# Patient Record
Sex: FEMALE | Race: WHITE | NOT HISPANIC OR LATINO | Employment: OTHER | URBAN - METROPOLITAN AREA
[De-identification: names, ages, dates, MRNs, and addresses within clinical notes are randomized per-mention and may not be internally consistent; named-entity substitution may affect disease eponyms.]

---

## 2023-09-21 PROBLEM — E78.2 MIXED HYPERLIPIDEMIA: Status: ACTIVE | Noted: 2023-09-21

## 2023-09-21 PROBLEM — E66.9 OBESITY (BMI 30.0-34.9): Status: ACTIVE | Noted: 2023-09-21

## 2023-09-21 PROBLEM — R26.81 UNSTEADY GAIT: Status: ACTIVE | Noted: 2023-09-21

## 2023-09-21 PROBLEM — E66.811 OBESITY (BMI 30.0-34.9): Status: ACTIVE | Noted: 2023-09-21

## 2023-09-21 RX ORDER — IBUPROFEN 100 MG/5ML
1000 SUSPENSION, ORAL (FINAL DOSE FORM) ORAL DAILY
COMMUNITY

## 2023-09-21 RX ORDER — ATORVASTATIN CALCIUM 10 MG/1
10 TABLET, FILM COATED ORAL DAILY
COMMUNITY
End: 2023-12-27 | Stop reason: SDUPTHER

## 2023-09-21 RX ORDER — CYANOCOBALAMIN (VITAMIN B-12) 500 MCG
800 TABLET ORAL DAILY
COMMUNITY

## 2023-09-21 RX ORDER — CLOBETASOL PROPIONATE 0.5 MG/G
1 CREAM TOPICAL 2 TIMES DAILY
COMMUNITY

## 2023-09-21 RX ORDER — NYSTATIN 100000 U/G
1 CREAM TOPICAL 2 TIMES DAILY
COMMUNITY
Start: 2022-06-10

## 2023-12-27 ENCOUNTER — TELEPHONE (OUTPATIENT)
Dept: PRIMARY CARE | Facility: CLINIC | Age: 78
End: 2023-12-27

## 2023-12-27 DIAGNOSIS — E78.2 MIXED HYPERLIPIDEMIA: Primary | ICD-10-CM

## 2023-12-27 RX ORDER — ATORVASTATIN CALCIUM 10 MG/1
10 TABLET, FILM COATED ORAL DAILY
Qty: 90 TABLET | Refills: 1 | Status: SHIPPED | OUTPATIENT
Start: 2023-12-27 | End: 2024-04-04 | Stop reason: SDUPTHER

## 2023-12-27 NOTE — TELEPHONE ENCOUNTER
States [facility] on [address] closed States her Atorvastatin had one more refill on it but her pharmacy closed without notice. States she needs a refill of her Atorvastatin sent to [address] Please advise on rx

## 2024-03-14 ENCOUNTER — LAB (OUTPATIENT)
Dept: LAB | Facility: LAB | Age: 79
End: 2024-03-14
Payer: MEDICARE

## 2024-03-14 DIAGNOSIS — Z00.00 ENCOUNTER FOR GENERAL ADULT MEDICAL EXAMINATION WITHOUT ABNORMAL FINDINGS: Primary | ICD-10-CM

## 2024-03-14 LAB
ALBUMIN SERPL-MCNC: 4.2 G/DL (ref 3.5–5)
ALP BLD-CCNC: 121 U/L (ref 35–125)
ALT SERPL-CCNC: 16 U/L (ref 5–40)
ANION GAP SERPL CALC-SCNC: 11 MMOL/L
AST SERPL-CCNC: 21 U/L (ref 5–40)
BILIRUB SERPL-MCNC: 0.6 MG/DL (ref 0.1–1.2)
BUN SERPL-MCNC: 12 MG/DL (ref 8–25)
CALCIUM SERPL-MCNC: 9.7 MG/DL (ref 8.5–10.4)
CHLORIDE SERPL-SCNC: 96 MMOL/L (ref 97–107)
CHOLEST SERPL-MCNC: 182 MG/DL (ref 133–200)
CHOLEST/HDLC SERPL: 3.4 {RATIO}
CO2 SERPL-SCNC: 27 MMOL/L (ref 24–31)
CREAT SERPL-MCNC: 0.7 MG/DL (ref 0.4–1.6)
EGFRCR SERPLBLD CKD-EPI 2021: 89 ML/MIN/1.73M*2
GLUCOSE SERPL-MCNC: 115 MG/DL (ref 65–99)
HDLC SERPL-MCNC: 54 MG/DL
LDLC SERPL CALC-MCNC: 107 MG/DL (ref 65–130)
POTASSIUM SERPL-SCNC: 4.5 MMOL/L (ref 3.4–5.1)
PROT SERPL-MCNC: 6.7 G/DL (ref 5.9–7.9)
SODIUM SERPL-SCNC: 134 MMOL/L (ref 133–145)
TRIGL SERPL-MCNC: 104 MG/DL (ref 40–150)

## 2024-03-14 PROCEDURE — 36415 COLL VENOUS BLD VENIPUNCTURE: CPT

## 2024-03-14 PROCEDURE — 80061 LIPID PANEL: CPT

## 2024-03-14 PROCEDURE — 80053 COMPREHEN METABOLIC PANEL: CPT

## 2024-04-04 RX ORDER — CLOBETASOL PROPIONATE 0.5 MG/G
1 CREAM TOPICAL 2 TIMES DAILY
Qty: 60 G | Refills: 1 | Status: CANCELLED | OUTPATIENT
Start: 2024-04-04

## 2024-04-04 RX ORDER — NYSTATIN 100000 U/G
1 CREAM TOPICAL 2 TIMES DAILY
Qty: 60 G | Status: CANCELLED | OUTPATIENT
Start: 2024-04-04

## 2024-04-04 ASSESSMENT — ENCOUNTER SYMPTOMS
CARDIOVASCULAR NEGATIVE: 1
RESPIRATORY NEGATIVE: 1
CONSTITUTIONAL NEGATIVE: 1
GASTROINTESTINAL NEGATIVE: 1
MUSCULOSKELETAL NEGATIVE: 1

## 2024-04-04 NOTE — PROGRESS NOTES
"History Of Present Illness  Tiffanie Perez is a 78 y.o. female presenting for \"Follow-up (6 month follow up).\"    HPI 78 year old female here for her six month follow up, no concerns. She does not do mammograms she recently moved to [place].      Past Medical History  Patient Active Problem List    Diagnosis Date Noted    Mixed hyperlipidemia 09/21/2023    Obesity (BMI 30.0-34.9) 09/21/2023    Unsteady gait 09/21/2023        Medications  Current Outpatient Medications   Medication Instructions    ascorbic acid (VITAMIN C) 1,000 mg, oral, Daily    atorvastatin (LIPITOR) 10 mg, oral, Daily    cholecalciferol (VITAMIN D-3) 800 Units, oral, Daily    clobetasol (Temovate) 0.05 % cream 1 Application, Topical, 2 times daily, to affected area    docosahexaenoic acid/epa (FISH OIL ORAL) 1 capsule, oral, Daily    multivit-min/ferrous fumarate (MULTI VITAMIN ORAL) 1 tablet, oral, Daily    mv-min/FA/vit K/lycop/lut/zeax (OCUVITE EYE PLUS MULTI ORAL) oral, 2 times daily    nystatin (Mycostatin) cream 1 Application, Topical, 2 times daily        Surgical History  She has no past surgical history on file.     Social History  She reports that she has never smoked. She has never been exposed to tobacco smoke. She has never used smokeless tobacco. She reports current alcohol use. She reports that she does not use drugs.    Family History  No family history on file.     Allergies  Adhesive tape-silicones and Other    ROS  Review of Systems   Constitutional: Negative.    Respiratory: Negative.     Cardiovascular: Negative.    Gastrointestinal: Negative.    Genitourinary: Negative.    Musculoskeletal: Negative.         Last Recorded Vitals  /76 (BP Location: Right arm, Patient Position: Sitting, BP Cuff Size: Adult)   Pulse 64   Temp 36.3 °C (97.3 °F)   Resp 18   Wt 78 kg (172 lb)   SpO2 99%     Physical Exam  Vitals and nursing note reviewed.   Constitutional:       Appearance: Normal appearance.   Eyes:      Pupils: " Pupils are equal, round, and reactive to light.   Cardiovascular:      Rate and Rhythm: Normal rate and regular rhythm.      Pulses: Normal pulses.      Heart sounds: Normal heart sounds.   Pulmonary:      Effort: Pulmonary effort is normal.      Breath sounds: Normal breath sounds.   Neurological:      Mental Status: She is alert.         Relevant Results  Component      Latest Ref Rng 3/14/2024   GLUCOSE      65 - 99 mg/dL 115 (H)    SODIUM      133 - 145 mmol/L 134    POTASSIUM      3.4 - 5.1 mmol/L 4.5    CHLORIDE      97 - 107 mmol/L 96 (L)    Bicarbonate      24 - 31 mmol/L 27    Blood Urea Nitrogen      8 - 25 mg/dL 12    Creatinine      0.40 - 1.60 mg/dL 0.70    EGFR      >60 mL/min/1.73m*2 89    Calcium      8.5 - 10.4 mg/dL 9.7    Albumin      3.5 - 5.0 g/dL 4.2    Alkaline Phosphatase      35 - 125 U/L 121    Total Protein      5.9 - 7.9 g/dL 6.7    AST      5 - 40 U/L 21    Bilirubin Total      0.1 - 1.2 mg/dL 0.6    ALT      5 - 40 U/L 16    Anion Gap      <=19 mmol/L 11    CHOLESTEROL      133 - 200 mg/dL 182    HDL CHOLESTEROL      >50.0 mg/dL 54.0    Cholesterol/HDL Ratio      SEE COMMENT  3.4    LDL Calculated      65 - 130 mg/dL 107    TRIGLYCERIDES      40 - 150 mg/dL 104       Legend:  (H) High  (L) Low    Assessment/Plan   Tiffanie was seen today for follow-up.  Diagnoses and all orders for this visit:  Mixed hyperlipidemia (Primary)  -     atorvastatin (Lipitor) 10 mg tablet; Take 1 tablet (10 mg) by mouth once daily.          COUNSELING      Medication education:              Education:  The patient is counseled regarding potential side-effects of any and all new medications             Understanding:  Patient expressed understanding             Adherence:  No barriers to adherence identified        Elizabeth Hou, APRN-CNP

## 2024-04-05 ENCOUNTER — OFFICE VISIT (OUTPATIENT)
Dept: PRIMARY CARE | Facility: CLINIC | Age: 79
End: 2024-04-05
Payer: MEDICARE

## 2024-04-05 VITALS
BODY MASS INDEX: 27.64 KG/M2 | SYSTOLIC BLOOD PRESSURE: 138 MMHG | OXYGEN SATURATION: 99 % | WEIGHT: 172 LBS | HEIGHT: 66 IN | TEMPERATURE: 97.3 F | DIASTOLIC BLOOD PRESSURE: 76 MMHG | HEART RATE: 64 BPM | RESPIRATION RATE: 18 BRPM

## 2024-04-05 DIAGNOSIS — E78.2 MIXED HYPERLIPIDEMIA: Primary | ICD-10-CM

## 2024-04-05 PROCEDURE — 99214 OFFICE O/P EST MOD 30 MIN: CPT | Performed by: NURSE PRACTITIONER

## 2024-04-05 PROCEDURE — 1123F ACP DISCUSS/DSCN MKR DOCD: CPT | Performed by: NURSE PRACTITIONER

## 2024-04-05 PROCEDURE — 1036F TOBACCO NON-USER: CPT | Performed by: NURSE PRACTITIONER

## 2024-04-05 PROCEDURE — 1125F AMNT PAIN NOTED PAIN PRSNT: CPT | Performed by: NURSE PRACTITIONER

## 2024-04-05 PROCEDURE — 1159F MED LIST DOCD IN RCRD: CPT | Performed by: NURSE PRACTITIONER

## 2024-04-05 PROCEDURE — 1158F ADVNC CARE PLAN TLK DOCD: CPT | Performed by: NURSE PRACTITIONER

## 2024-04-05 RX ORDER — ATORVASTATIN CALCIUM 10 MG/1
10 TABLET, FILM COATED ORAL DAILY
Qty: 90 TABLET | Refills: 1 | Status: SHIPPED | OUTPATIENT
Start: 2024-04-05 | End: 2024-10-02

## 2024-04-05 ASSESSMENT — LIFESTYLE VARIABLES
HOW OFTEN DO YOU HAVE SIX OR MORE DRINKS ON ONE OCCASION: NEVER
HOW OFTEN DO YOU HAVE A DRINK CONTAINING ALCOHOL: 2-3 TIMES A WEEK
AUDIT-C TOTAL SCORE: 3
AUDIT TOTAL SCORE: 3
SKIP TO QUESTIONS 9-10: 1
HAVE YOU OR SOMEONE ELSE BEEN INJURED AS A RESULT OF YOUR DRINKING: NO
HOW MANY STANDARD DRINKS CONTAINING ALCOHOL DO YOU HAVE ON A TYPICAL DAY: 1 OR 2
HAS A RELATIVE, FRIEND, DOCTOR, OR ANOTHER HEALTH PROFESSIONAL EXPRESSED CONCERN ABOUT YOUR DRINKING OR SUGGESTED YOU CUT DOWN: NO

## 2024-04-05 ASSESSMENT — PATIENT HEALTH QUESTIONNAIRE - PHQ9
SUM OF ALL RESPONSES TO PHQ9 QUESTIONS 1 AND 2: 0
2. FEELING DOWN, DEPRESSED OR HOPELESS: NOT AT ALL
1. LITTLE INTEREST OR PLEASURE IN DOING THINGS: NOT AT ALL

## 2024-04-05 ASSESSMENT — PAIN SCALES - GENERAL: PAINLEVEL: 7

## 2024-09-18 ENCOUNTER — OFFICE VISIT (OUTPATIENT)
Dept: PRIMARY CARE | Facility: CLINIC | Age: 79
End: 2024-09-18
Payer: MEDICARE

## 2024-09-18 VITALS
SYSTOLIC BLOOD PRESSURE: 124 MMHG | HEART RATE: 67 BPM | DIASTOLIC BLOOD PRESSURE: 78 MMHG | HEIGHT: 66 IN | WEIGHT: 177 LBS | TEMPERATURE: 96.7 F | BODY MASS INDEX: 28.45 KG/M2 | OXYGEN SATURATION: 98 % | RESPIRATION RATE: 18 BRPM

## 2024-09-18 DIAGNOSIS — Z00.00 ANNUAL PHYSICAL EXAM: Primary | ICD-10-CM

## 2024-09-18 DIAGNOSIS — R21 RASH: ICD-10-CM

## 2024-09-18 DIAGNOSIS — E78.2 MIXED HYPERLIPIDEMIA: ICD-10-CM

## 2024-09-18 PROCEDURE — 1125F AMNT PAIN NOTED PAIN PRSNT: CPT | Performed by: NURSE PRACTITIONER

## 2024-09-18 PROCEDURE — 1160F RVW MEDS BY RX/DR IN RCRD: CPT | Performed by: NURSE PRACTITIONER

## 2024-09-18 PROCEDURE — 1159F MED LIST DOCD IN RCRD: CPT | Performed by: NURSE PRACTITIONER

## 2024-09-18 PROCEDURE — 1123F ACP DISCUSS/DSCN MKR DOCD: CPT | Performed by: NURSE PRACTITIONER

## 2024-09-18 PROCEDURE — 90662 IIV NO PRSV INCREASED AG IM: CPT | Performed by: NURSE PRACTITIONER

## 2024-09-18 PROCEDURE — 1036F TOBACCO NON-USER: CPT | Performed by: NURSE PRACTITIONER

## 2024-09-18 PROCEDURE — 1158F ADVNC CARE PLAN TLK DOCD: CPT | Performed by: NURSE PRACTITIONER

## 2024-09-18 PROCEDURE — G0439 PPPS, SUBSEQ VISIT: HCPCS | Performed by: NURSE PRACTITIONER

## 2024-09-18 PROCEDURE — 99215 OFFICE O/P EST HI 40 MIN: CPT | Performed by: NURSE PRACTITIONER

## 2024-09-18 RX ORDER — ATORVASTATIN CALCIUM 10 MG/1
10 TABLET, FILM COATED ORAL DAILY
Qty: 90 TABLET | Refills: 1 | Status: SHIPPED | OUTPATIENT
Start: 2024-09-18 | End: 2025-03-17

## 2024-09-18 RX ORDER — CLOBETASOL PROPIONATE 0.5 MG/G
1 CREAM TOPICAL 2 TIMES DAILY
Qty: 60 G | Refills: 5 | Status: SHIPPED | OUTPATIENT
Start: 2024-09-18

## 2024-09-18 ASSESSMENT — PAIN SCALES - GENERAL: PAINLEVEL: 8

## 2024-09-18 NOTE — PROGRESS NOTES
History Of Present Illness  Tiffanie Perez is a 79 y.o. female presenting for a wellness exam.    Preventative screenings:  Due for labs does not do mammo or cologuard     Other medical issues/concerns:   Pt turned in DNR paperwork donating body to science     Patient Care Team:  Elizabeth Hou, BALAJI-CNP as PCP - General (Family Medicine)     General health: Good  Exercise: Balanced  Caffeine: Limited   Diet: Balanced    Functional ability:   No cognitive impairment observed.    No cognitive impairment reported by patient or family.    ADL screening:  Hearing without difficulties.  Independent in bathing, dressing, walking in home    IADL screening:  Independent in managing finances, grocery shopping, taking medications, doing housework    Home Safety:   [place] Home safety risk factors: No loose rugs, poor lighting, stairs without rails, bathroom with no grab bars    Depression screening:  Patient Health Questionnaire-2 Score: 0     CARDIAC SCREENING   The 10-year ASCVD risk score (Yazan VIVEROS, et al., 2019) is: 22.9%    Values used to calculate the score:      Age: 79 years      Sex: Female      Is Non- : No      Diabetic: No      Tobacco smoker: No      Systolic Blood Pressure: 124 mmHg      Is BP treated: No      HDL Cholesterol: 54 mg/dL      Total Cholesterol: 182 mg/dL       Past Medical History  Patient Active Problem List    Diagnosis Date Noted   • Mixed hyperlipidemia 09/21/2023   • Obesity (BMI 30.0-34.9) 09/21/2023   • Unsteady gait 09/21/2023        Medications  Medications and current supplements were reviewed and recorded.   Does the patient use opiate medications:  No . If yes, do they take medication appropriately: NA.  Current Outpatient Medications   Medication Sig Dispense Refill   • ascorbic acid (Vitamin C) 1,000 mg tablet Take 1 tablet (1,000 mg) by mouth once daily.     • atorvastatin (Lipitor) 10 mg tablet Take 1 tablet (10 mg) by mouth once daily. 90 tablet 1   •  cholecalciferol (Vitamin D-3) 10 MCG (400 UNIT) tablet Take 2 tablets (20 mcg) by mouth once daily.     • clobetasol (Temovate) 0.05 % cream Apply 1 Application topically 2 times a day. to affected area     • docosahexaenoic acid/epa (FISH OIL ORAL) Take 1 capsule by mouth once daily.     • multivit-min/ferrous fumarate (MULTI VITAMIN ORAL) Take 1 tablet by mouth once daily.     • mv-min/FA/vit K/lycop/lut/zeax (OCUVITE EYE PLUS MULTI ORAL) Take by mouth 2 times a day.     • nystatin (Mycostatin) cream Apply 1 Application topically 2 times a day.       No current facility-administered medications for this visit.        Surgical History  She has no past surgical history on file.     Social History  She reports that she has never smoked. She has never been exposed to tobacco smoke. She has never used smokeless tobacco. She reports current alcohol use. She reports that she does not use drugs.    Family History  No family history on file.     Allergies  Adhesive tape-silicones and Other    Immunizations  Immunization History   Administered Date(s) Administered   • Flu vaccine (IIV4), preservative free *Check age/dose* 09/22/2020   • Flu vaccine, trivalent, preservative free, HIGH-DOSE, age 65y+ (Fluzone) 09/26/2019   • Influenza, Seasonal, Quadrivalent, Adjuvanted 09/24/2021, 08/26/2022   • Influenza, injectable, quadrivalent 09/01/2017   • Influenza, trivalent, adjuvanted 09/07/2017, 10/03/2018   • Moderna SARS-CoV-2 Vaccination 03/04/2021, 04/01/2021, 01/07/2022   • Pfizer COVID-19 vaccine, bivalent, age 12 years and older (30 mcg/0.3 mL) 10/07/2022   • Pneumococcal conjugate vaccine, 13-valent (PREVNAR 13) 09/11/2017   • Pneumococcal polysaccharide vaccine, 23-valent, age 2 years and older (PNEUMOVAX 23) 10/03/2018, 12/26/2018        ROS  Negative, except as discussed in HPI     Vitals  /78 (BP Location: Right arm, Patient Position: Sitting, BP Cuff Size: Adult)   Pulse 67   Temp 35.9 °C (96.7 °F)   Resp 18   " Ht 1.676 m (5' 6\")   Wt 80.3 kg (177 lb)   SpO2 98%   BMI 28.57 kg/m²      Physical Exam  Vitals and nursing note reviewed.   Constitutional:       Appearance: Normal appearance.   HENT:      Head: Normocephalic and atraumatic.      Right Ear: Tympanic membrane, ear canal and external ear normal.      Left Ear: Tympanic membrane, ear canal and external ear normal.      Nose: Nose normal.      Mouth/Throat:      Mouth: Mucous membranes are moist.      Pharynx: Oropharynx is clear.   Eyes:      Extraocular Movements: Extraocular movements intact.      Conjunctiva/sclera: Conjunctivae normal.      Pupils: Pupils are equal, round, and reactive to light.   Neck:      Thyroid: No thyromegaly.   Cardiovascular:      Rate and Rhythm: Normal rate and regular rhythm.      Pulses: Normal pulses.      Heart sounds: Normal heart sounds, S1 normal and S2 normal.   Pulmonary:      Effort: Pulmonary effort is normal.      Breath sounds: Normal breath sounds. No wheezing or rhonchi.   Abdominal:      General: Bowel sounds are normal.      Palpations: Abdomen is soft. There is no mass.      Tenderness: There is no abdominal tenderness. There is no guarding.   Genitourinary:     Comments: Not examined  Musculoskeletal:         General: Normal range of motion.      Cervical back: Normal range of motion.      Right lower leg: No edema.      Left lower leg: No edema.   Lymphadenopathy:      Cervical: No cervical adenopathy.   Skin:     General: Skin is warm and dry.      Capillary Refill: Capillary refill takes less than 2 seconds.      Findings: No rash.   Neurological:      General: No focal deficit present.      Mental Status: She is alert and oriented to person, place, and time. Mental status is at baseline.      Cranial Nerves: Cranial nerves 2-12 are intact. No cranial nerve deficit.      Sensory: Sensation is intact.      Motor: Motor function is intact.      Coordination: Coordination is intact.      Gait: Gait is intact. " "  Psychiatric:         Mood and Affect: Mood normal.         Behavior: Behavior normal.         Thought Content: Thought content normal.         Judgment: Judgment normal.       Relevant Results  No results found for: \"WBC\", \"HGB\", \"HCT\", \"MCV\", \"PLT\"  Lab Results   Component Value Date     03/14/2024     02/24/2023    K 4.5 03/14/2024    K 4.6 02/24/2023    CL 96 (L) 03/14/2024     02/24/2023    CO2 27 03/14/2024    CO2 26 02/24/2023    BUN 12 03/14/2024    BUN 9 02/24/2023    CREATININE 0.70 03/14/2024    CREATININE 0.8 02/24/2023    CALCIUM 9.7 03/14/2024    CALCIUM 9.7 02/24/2023    PROT 6.7 03/14/2024    PROT 7.0 02/24/2023    BILITOT 0.6 03/14/2024    BILITOT 0.6 02/24/2023    ALKPHOS 121 03/14/2024    ALKPHOS 144 (H) 02/24/2023    ALT 16 03/14/2024    ALT 15 02/24/2023    AST 21 03/14/2024    AST 19 02/24/2023    GLUCOSE 115 (H) 03/14/2024    GLUCOSE 104 (H) 02/24/2023     Lab Results   Component Value Date    HGBA1C 5.7 03/04/2022     No results found for: \"TSH\", \"FREET4\"   Lab Results   Component Value Date    CHOL 182 03/14/2024    TRIG 104 03/14/2024    HDL 54.0 03/14/2024           Assessment/Plan   Tiffanie was seen today for annual exam.  Diagnoses and all orders for this visit:  Annual physical exam (Primary)  -     Comprehensive Metabolic Panel; Future  -     Lipid Panel; Future  Other orders  -     Flu vaccine, trivalent, preservative free, HIGH-DOSE, age 65y+ (Fluzone)       There are no discontinued medications.     Counseling:   Medication education:   -Education:  The patient is counseled regarding potential side-effects of any and all new medications  -Understanding:  Patient expressed understanding of information discussed today  -Adherence:  No barriers to adherence identified    Advanced care planning: Discussed including healthcare power of  as well as specific end-of-life choices and/or directives was discussed with the patient , voluntarily, and advance care " decision was documented in the patient's record.     Final treatment plan is a result of shared decision making with patient.         Elizabeth Hou, APRN-CNP         Tobacco/Alcohol/Opioid use, as well as Illicit Drug Use was screened for/reviewed and documented in Social Documentation section of the chart and medication list as appropriate    Depression Screening  Depression screening completed using the PHQ-2 questions with results documented in the chart/encounter (15m).  (See Rooming Screening section for documentation, or note for additional information)    Cardiac Risk Assessment  Cardiovascular risk was discussed and, if needed, lifestyle modifications recommended, including nutritional choices, exercise, and elimination of habits contributing to risk.   We agreed on a plan to reduce the current cardiovascular risk based on above discussion as needed.     Aspirin use/disuse was discussed and documented in the Problem List of the medical record (under Cardiac Risk Counseling) after reviewing the updated guidelines below:  Consider low dose Aspirin ( mg) use if the benefit for cardiovascular disease prevention outweighs risk for bleeding complications.   In general, low dose ASA should be considered:  In patients WITHOUT prior MI/stroke/PAD (primary prevention):   a. Age <60: Use if 10-year cardiovascular disease risk >20%, with discussion of risks and benefits with patient  b. Age 60-<70: Use if 10-year cardiovascular disease risk >20% and low bleeding (e.g., gastrointestinal) risk, with discussion of risks and benefits with patient  c. Age >=70: Do not use    In patients WITH prior MI/stroke/PAD (secondary prevention):   Generally use unless extremely high bleeding (e.g., gastrointenstinal) risk, with discussion of risks and benefits with patient    Advance Directives Discussion  Advanced Care Planning (including a Living Will, Healthcare POA, as well as specific end of life choices and/or  directives), was discussed with the patient and/or surrogate, voluntarily, and details of that discussion documented in the Problem List (under Advanced Directives Discussion) of the medical record.    (~16 min spent discussing above)     During the course of the visit the patient was educated and counseled about age appropriate screening and preventive services. Completed preventive screenings were documented in the chart and orders were placed for outstanding screenings/procedures as documented in the Assessment and Plan.    Patient Instructions (the written plan) was given to the patient at check out.

## 2025-01-01 ENCOUNTER — APPOINTMENT (OUTPATIENT)
Dept: RADIOLOGY | Facility: HOSPITAL | Age: 80
DRG: 871 | End: 2025-01-01
Payer: MEDICARE

## 2025-01-01 ENCOUNTER — HOSPITAL ENCOUNTER (INPATIENT)
Facility: HOSPITAL | Age: 80
LOS: 1 days | DRG: 871 | End: 2025-01-18
Attending: STUDENT IN AN ORGANIZED HEALTH CARE EDUCATION/TRAINING PROGRAM | Admitting: STUDENT IN AN ORGANIZED HEALTH CARE EDUCATION/TRAINING PROGRAM
Payer: MEDICARE

## 2025-01-01 ENCOUNTER — APPOINTMENT (OUTPATIENT)
Dept: CARDIOLOGY | Facility: HOSPITAL | Age: 80
DRG: 871 | End: 2025-01-01
Payer: MEDICARE

## 2025-01-01 VITALS
BODY MASS INDEX: 28.25 KG/M2 | SYSTOLIC BLOOD PRESSURE: 54 MMHG | OXYGEN SATURATION: 94 % | WEIGHT: 180 LBS | DIASTOLIC BLOOD PRESSURE: 38 MMHG | TEMPERATURE: 97.7 F | HEART RATE: 29 BPM | HEIGHT: 67 IN

## 2025-01-01 DIAGNOSIS — E87.5 HYPERKALEMIA: ICD-10-CM

## 2025-01-01 DIAGNOSIS — J18.9 PNEUMONIA OF BOTH LOWER LOBES DUE TO INFECTIOUS ORGANISM: ICD-10-CM

## 2025-01-01 DIAGNOSIS — R68.89 OTHER GENERAL SYMPTOMS AND SIGNS: ICD-10-CM

## 2025-01-01 DIAGNOSIS — N17.9 SEPSIS WITH ACUTE RENAL FAILURE WITHOUT SEPTIC SHOCK, DUE TO UNSPECIFIED ORGANISM, UNSPECIFIED ACUTE RENAL FAILURE TYPE (MULTI): ICD-10-CM

## 2025-01-01 DIAGNOSIS — I48.91 ATRIAL FIBRILLATION WITH RVR (MULTI): Primary | ICD-10-CM

## 2025-01-01 DIAGNOSIS — A41.9 SEPSIS WITH ACUTE RENAL FAILURE WITHOUT SEPTIC SHOCK, DUE TO UNSPECIFIED ORGANISM, UNSPECIFIED ACUTE RENAL FAILURE TYPE (MULTI): ICD-10-CM

## 2025-01-01 DIAGNOSIS — E87.1 HYPONATREMIA: ICD-10-CM

## 2025-01-01 DIAGNOSIS — R79.89 ELEVATED TROPONIN: ICD-10-CM

## 2025-01-01 DIAGNOSIS — R65.20 SEPSIS WITH ACUTE RENAL FAILURE WITHOUT SEPTIC SHOCK, DUE TO UNSPECIFIED ORGANISM, UNSPECIFIED ACUTE RENAL FAILURE TYPE (MULTI): ICD-10-CM

## 2025-01-01 DIAGNOSIS — N17.9 ACUTE KIDNEY INJURY (CMS-HCC): ICD-10-CM

## 2025-01-01 LAB
ALBUMIN SERPL BCP-MCNC: 3.8 G/DL (ref 3.4–5)
ALP SERPL-CCNC: 173 U/L (ref 33–136)
ALT SERPL W P-5'-P-CCNC: 119 U/L (ref 7–45)
ANION GAP SERPL CALCULATED.3IONS-SCNC: 16 MMOL/L (ref 10–20)
ANION GAP SERPL CALCULATED.3IONS-SCNC: 17 MMOL/L (ref 10–20)
APAP SERPL-MCNC: <10 UG/ML
APPEARANCE UR: CLEAR
AST SERPL W P-5'-P-CCNC: 113 U/L (ref 9–39)
BASOPHILS # BLD AUTO: 0.03 X10*3/UL (ref 0–0.1)
BASOPHILS NFR BLD AUTO: 0.2 %
BILIRUB SERPL-MCNC: 1.7 MG/DL (ref 0–1.2)
BILIRUB UR STRIP.AUTO-MCNC: NEGATIVE MG/DL
BNP SERPL-MCNC: 2073 PG/ML (ref 0–99)
BUN SERPL-MCNC: 38 MG/DL (ref 6–23)
BUN SERPL-MCNC: 38 MG/DL (ref 6–23)
CALCIUM SERPL-MCNC: 10 MG/DL (ref 8.6–10.3)
CALCIUM SERPL-MCNC: 9.4 MG/DL (ref 8.6–10.3)
CARDIAC TROPONIN I PNL SERPL HS: 678 NG/L (ref 0–13)
CARDIAC TROPONIN I PNL SERPL HS: 681 NG/L (ref 0–13)
CHLORIDE SERPL-SCNC: 92 MMOL/L (ref 98–107)
CHLORIDE SERPL-SCNC: 93 MMOL/L (ref 98–107)
CK SERPL-CCNC: 76 U/L (ref 0–215)
CO2 SERPL-SCNC: 21 MMOL/L (ref 21–32)
CO2 SERPL-SCNC: 21 MMOL/L (ref 21–32)
COLOR UR: YELLOW
CREAT SERPL-MCNC: 1.07 MG/DL (ref 0.5–1.05)
CREAT SERPL-MCNC: 1.2 MG/DL (ref 0.5–1.05)
CREAT UR-MCNC: 163.6 MG/DL (ref 20–320)
D DIMER PPP FEU-MCNC: 13.77 MG/L FEU (ref 0.19–0.5)
EGFRCR SERPLBLD CKD-EPI 2021: 46 ML/MIN/1.73M*2
EGFRCR SERPLBLD CKD-EPI 2021: 53 ML/MIN/1.73M*2
EOSINOPHIL # BLD AUTO: 0.01 X10*3/UL (ref 0–0.4)
EOSINOPHIL NFR BLD AUTO: 0.1 %
ERYTHROCYTE [DISTWIDTH] IN BLOOD BY AUTOMATED COUNT: 15.1 % (ref 11.5–14.5)
FLUAV RNA RESP QL NAA+PROBE: NOT DETECTED
FLUBV RNA RESP QL NAA+PROBE: NOT DETECTED
GLUCOSE BLD MANUAL STRIP-MCNC: 181 MG/DL (ref 74–99)
GLUCOSE SERPL-MCNC: 128 MG/DL (ref 74–99)
GLUCOSE SERPL-MCNC: 158 MG/DL (ref 74–99)
GLUCOSE UR STRIP.AUTO-MCNC: NORMAL MG/DL
HCT VFR BLD AUTO: 43.8 % (ref 36–46)
HGB BLD-MCNC: 15 G/DL (ref 12–16)
HOLD SPECIMEN: NORMAL
HYALINE CASTS #/AREA URNS AUTO: ABNORMAL /LPF
IMM GRANULOCYTES # BLD AUTO: 0.1 X10*3/UL (ref 0–0.5)
IMM GRANULOCYTES NFR BLD AUTO: 0.7 % (ref 0–0.9)
KETONES UR STRIP.AUTO-MCNC: NEGATIVE MG/DL
LACTATE SERPL-SCNC: 2 MMOL/L (ref 0.4–2)
LEUKOCYTE ESTERASE UR QL STRIP.AUTO: NEGATIVE
LYMPHOCYTES # BLD AUTO: 1.03 X10*3/UL (ref 0.8–3)
LYMPHOCYTES NFR BLD AUTO: 7.2 %
MAGNESIUM SERPL-MCNC: 2.26 MG/DL (ref 1.6–2.4)
MCH RBC QN AUTO: 31.8 PG (ref 26–34)
MCHC RBC AUTO-ENTMCNC: 34.2 G/DL (ref 32–36)
MCV RBC AUTO: 93 FL (ref 80–100)
MONOCYTES # BLD AUTO: 0.92 X10*3/UL (ref 0.05–0.8)
MONOCYTES NFR BLD AUTO: 6.4 %
MUCOUS THREADS #/AREA URNS AUTO: ABNORMAL /LPF
NEUTROPHILS # BLD AUTO: 12.2 X10*3/UL (ref 1.6–5.5)
NEUTROPHILS NFR BLD AUTO: 85.4 %
NITRITE UR QL STRIP.AUTO: NEGATIVE
NRBC BLD-RTO: 0.1 /100 WBCS (ref 0–0)
OSMOLALITY UR: 772 MOSM/KG (ref 200–1200)
PH UR STRIP.AUTO: 5.5 [PH]
PHOSPHATE SERPL-MCNC: 3.9 MG/DL (ref 2.5–4.9)
PLATELET # BLD AUTO: 231 X10*3/UL (ref 150–450)
POTASSIUM SERPL-SCNC: 5.5 MMOL/L (ref 3.5–5.3)
POTASSIUM SERPL-SCNC: 5.8 MMOL/L (ref 3.5–5.3)
PROT SERPL-MCNC: 7 G/DL (ref 6.4–8.2)
PROT UR STRIP.AUTO-MCNC: ABNORMAL MG/DL
RBC # BLD AUTO: 4.72 X10*6/UL (ref 4–5.2)
RBC # UR STRIP.AUTO: NEGATIVE /UL
RBC #/AREA URNS AUTO: ABNORMAL /HPF
SARS-COV-2 RNA RESP QL NAA+PROBE: NOT DETECTED
SODIUM SERPL-SCNC: 124 MMOL/L (ref 136–145)
SODIUM SERPL-SCNC: 124 MMOL/L (ref 136–145)
SODIUM UR-SCNC: <10 MMOL/L
SODIUM/CREAT UR-RTO: NORMAL
SP GR UR STRIP.AUTO: >1.05
TSH SERPL-ACNC: 3.56 MIU/L (ref 0.44–3.98)
UROBILINOGEN UR STRIP.AUTO-MCNC: ABNORMAL MG/DL
WBC # BLD AUTO: 14.3 X10*3/UL (ref 4.4–11.3)
WBC #/AREA URNS AUTO: ABNORMAL /HPF

## 2025-01-01 PROCEDURE — 36415 COLL VENOUS BLD VENIPUNCTURE: CPT

## 2025-01-01 PROCEDURE — 81001 URINALYSIS AUTO W/SCOPE: CPT

## 2025-01-01 PROCEDURE — 85025 COMPLETE CBC W/AUTO DIFF WBC: CPT

## 2025-01-01 PROCEDURE — 84484 ASSAY OF TROPONIN QUANT: CPT

## 2025-01-01 PROCEDURE — 2500000004 HC RX 250 GENERAL PHARMACY W/ HCPCS (ALT 636 FOR OP/ED): Mod: JZ | Performed by: STUDENT IN AN ORGANIZED HEALTH CARE EDUCATION/TRAINING PROGRAM

## 2025-01-01 PROCEDURE — 96375 TX/PRO/DX INJ NEW DRUG ADDON: CPT

## 2025-01-01 PROCEDURE — 71045 X-RAY EXAM CHEST 1 VIEW: CPT

## 2025-01-01 PROCEDURE — 80053 COMPREHEN METABOLIC PANEL: CPT

## 2025-01-01 PROCEDURE — 84100 ASSAY OF PHOSPHORUS: CPT | Performed by: STUDENT IN AN ORGANIZED HEALTH CARE EDUCATION/TRAINING PROGRAM

## 2025-01-01 PROCEDURE — 80143 DRUG ASSAY ACETAMINOPHEN: CPT | Performed by: STUDENT IN AN ORGANIZED HEALTH CARE EDUCATION/TRAINING PROGRAM

## 2025-01-01 PROCEDURE — 2060000001 HC INTERMEDIATE ICU ROOM DAILY

## 2025-01-01 PROCEDURE — 87449 NOS EACH ORGANISM AG IA: CPT | Mod: WESLAB | Performed by: STUDENT IN AN ORGANIZED HEALTH CARE EDUCATION/TRAINING PROGRAM

## 2025-01-01 PROCEDURE — 82570 ASSAY OF URINE CREATININE: CPT | Performed by: STUDENT IN AN ORGANIZED HEALTH CARE EDUCATION/TRAINING PROGRAM

## 2025-01-01 PROCEDURE — 2550000001 HC RX 255 CONTRASTS: Performed by: STUDENT IN AN ORGANIZED HEALTH CARE EDUCATION/TRAINING PROGRAM

## 2025-01-01 PROCEDURE — 71275 CT ANGIOGRAPHY CHEST: CPT

## 2025-01-01 PROCEDURE — 84443 ASSAY THYROID STIM HORMONE: CPT | Performed by: STUDENT IN AN ORGANIZED HEALTH CARE EDUCATION/TRAINING PROGRAM

## 2025-01-01 PROCEDURE — 99291 CRITICAL CARE FIRST HOUR: CPT | Performed by: STUDENT IN AN ORGANIZED HEALTH CARE EDUCATION/TRAINING PROGRAM

## 2025-01-01 PROCEDURE — 2500000004 HC RX 250 GENERAL PHARMACY W/ HCPCS (ALT 636 FOR OP/ED): Performed by: STUDENT IN AN ORGANIZED HEALTH CARE EDUCATION/TRAINING PROGRAM

## 2025-01-01 PROCEDURE — 80048 BASIC METABOLIC PNL TOTAL CA: CPT | Mod: CCI | Performed by: STUDENT IN AN ORGANIZED HEALTH CARE EDUCATION/TRAINING PROGRAM

## 2025-01-01 PROCEDURE — 99222 1ST HOSP IP/OBS MODERATE 55: CPT

## 2025-01-01 PROCEDURE — 93005 ELECTROCARDIOGRAM TRACING: CPT

## 2025-01-01 PROCEDURE — 71045 X-RAY EXAM CHEST 1 VIEW: CPT | Performed by: RADIOLOGY

## 2025-01-01 PROCEDURE — 85300 ANTITHROMBIN III ACTIVITY: CPT

## 2025-01-01 PROCEDURE — 83935 ASSAY OF URINE OSMOLALITY: CPT | Mod: WESLAB | Performed by: STUDENT IN AN ORGANIZED HEALTH CARE EDUCATION/TRAINING PROGRAM

## 2025-01-01 PROCEDURE — 87899 AGENT NOS ASSAY W/OPTIC: CPT | Mod: WESLAB | Performed by: STUDENT IN AN ORGANIZED HEALTH CARE EDUCATION/TRAINING PROGRAM

## 2025-01-01 PROCEDURE — 71275 CT ANGIOGRAPHY CHEST: CPT | Performed by: RADIOLOGY

## 2025-01-01 PROCEDURE — 74176 CT ABD & PELVIS W/O CONTRAST: CPT | Performed by: RADIOLOGY

## 2025-01-01 PROCEDURE — 83735 ASSAY OF MAGNESIUM: CPT | Performed by: STUDENT IN AN ORGANIZED HEALTH CARE EDUCATION/TRAINING PROGRAM

## 2025-01-01 PROCEDURE — 96361 HYDRATE IV INFUSION ADD-ON: CPT

## 2025-01-01 PROCEDURE — 82947 ASSAY GLUCOSE BLOOD QUANT: CPT

## 2025-01-01 PROCEDURE — 87636 SARSCOV2 & INF A&B AMP PRB: CPT

## 2025-01-01 PROCEDURE — 82550 ASSAY OF CK (CPK): CPT | Performed by: STUDENT IN AN ORGANIZED HEALTH CARE EDUCATION/TRAINING PROGRAM

## 2025-01-01 PROCEDURE — 93971 EXTREMITY STUDY: CPT

## 2025-01-01 PROCEDURE — 2500000005 HC RX 250 GENERAL PHARMACY W/O HCPCS

## 2025-01-01 PROCEDURE — 83605 ASSAY OF LACTIC ACID: CPT | Performed by: STUDENT IN AN ORGANIZED HEALTH CARE EDUCATION/TRAINING PROGRAM

## 2025-01-01 PROCEDURE — 99291 CRITICAL CARE FIRST HOUR: CPT | Mod: 25 | Performed by: STUDENT IN AN ORGANIZED HEALTH CARE EDUCATION/TRAINING PROGRAM

## 2025-01-01 PROCEDURE — 74176 CT ABD & PELVIS W/O CONTRAST: CPT

## 2025-01-01 PROCEDURE — 99235 HOSP IP/OBS SAME DATE MOD 70: CPT | Performed by: STUDENT IN AN ORGANIZED HEALTH CARE EDUCATION/TRAINING PROGRAM

## 2025-01-01 PROCEDURE — 83036 HEMOGLOBIN GLYCOSYLATED A1C: CPT | Mod: WESLAB | Performed by: STUDENT IN AN ORGANIZED HEALTH CARE EDUCATION/TRAINING PROGRAM

## 2025-01-01 PROCEDURE — 99291 CRITICAL CARE FIRST HOUR: CPT

## 2025-01-01 PROCEDURE — 83880 ASSAY OF NATRIURETIC PEPTIDE: CPT | Performed by: STUDENT IN AN ORGANIZED HEALTH CARE EDUCATION/TRAINING PROGRAM

## 2025-01-01 PROCEDURE — 87077 CULTURE AEROBIC IDENTIFY: CPT | Mod: WESLAB | Performed by: STUDENT IN AN ORGANIZED HEALTH CARE EDUCATION/TRAINING PROGRAM

## 2025-01-01 PROCEDURE — 86738 MYCOPLASMA ANTIBODY: CPT | Performed by: STUDENT IN AN ORGANIZED HEALTH CARE EDUCATION/TRAINING PROGRAM

## 2025-01-01 PROCEDURE — 96374 THER/PROPH/DIAG INJ IV PUSH: CPT

## 2025-01-01 PROCEDURE — 87040 BLOOD CULTURE FOR BACTERIA: CPT | Mod: WESLAB | Performed by: STUDENT IN AN ORGANIZED HEALTH CARE EDUCATION/TRAINING PROGRAM

## 2025-01-01 PROCEDURE — 93971 EXTREMITY STUDY: CPT | Performed by: RADIOLOGY

## 2025-01-01 PROCEDURE — 83930 ASSAY OF BLOOD OSMOLALITY: CPT | Mod: WESLAB | Performed by: STUDENT IN AN ORGANIZED HEALTH CARE EDUCATION/TRAINING PROGRAM

## 2025-01-01 RX ORDER — AZITHROMYCIN 250 MG/1
250 TABLET, FILM COATED ORAL DAILY
Status: DISCONTINUED | OUTPATIENT
Start: 2025-01-19 | End: 2025-01-01 | Stop reason: HOSPADM

## 2025-01-01 RX ORDER — PANTOPRAZOLE SODIUM 40 MG/10ML
40 INJECTION, POWDER, LYOPHILIZED, FOR SOLUTION INTRAVENOUS
Status: DISCONTINUED | OUTPATIENT
Start: 2025-01-19 | End: 2025-01-01 | Stop reason: HOSPADM

## 2025-01-01 RX ORDER — ONDANSETRON HYDROCHLORIDE 2 MG/ML
4 INJECTION, SOLUTION INTRAVENOUS EVERY 8 HOURS PRN
Status: DISCONTINUED | OUTPATIENT
Start: 2025-01-01 | End: 2025-01-01 | Stop reason: HOSPADM

## 2025-01-01 RX ORDER — SODIUM CHLORIDE 9 MG/ML
75 INJECTION, SOLUTION INTRAVENOUS CONTINUOUS
Status: DISCONTINUED | OUTPATIENT
Start: 2025-01-01 | End: 2025-01-01 | Stop reason: HOSPADM

## 2025-01-01 RX ORDER — DILTIAZEM HYDROCHLORIDE 5 MG/ML
0.25 INJECTION INTRAVENOUS ONCE
Status: COMPLETED | OUTPATIENT
Start: 2025-01-01 | End: 2025-01-01

## 2025-01-01 RX ORDER — CEFTRIAXONE 2 G/50ML
2 INJECTION, SOLUTION INTRAVENOUS ONCE
Status: COMPLETED | OUTPATIENT
Start: 2025-01-01 | End: 2025-01-01

## 2025-01-01 RX ORDER — POLYETHYLENE GLYCOL 3350 17 G/17G
17 POWDER, FOR SOLUTION ORAL DAILY
Status: DISCONTINUED | OUTPATIENT
Start: 2025-01-01 | End: 2025-01-01 | Stop reason: HOSPADM

## 2025-01-01 RX ORDER — CALCIUM GLUCONATE 20 MG/ML
2 INJECTION, SOLUTION INTRAVENOUS ONCE
Status: COMPLETED | OUTPATIENT
Start: 2025-01-01 | End: 2025-01-01

## 2025-01-01 RX ORDER — METOPROLOL TARTRATE 25 MG/1
25 TABLET, FILM COATED ORAL 2 TIMES DAILY
Status: DISCONTINUED | OUTPATIENT
Start: 2025-01-01 | End: 2025-01-01 | Stop reason: HOSPADM

## 2025-01-01 RX ORDER — HEPARIN SODIUM 5000 [USP'U]/ML
5000 INJECTION, SOLUTION INTRAVENOUS; SUBCUTANEOUS EVERY 8 HOURS SCHEDULED
Status: DISCONTINUED | OUTPATIENT
Start: 2025-01-01 | End: 2025-01-01 | Stop reason: HOSPADM

## 2025-01-01 RX ORDER — CEFTRIAXONE 1 G/50ML
1 INJECTION, SOLUTION INTRAVENOUS EVERY 24 HOURS
Status: DISCONTINUED | OUTPATIENT
Start: 2025-01-19 | End: 2025-01-01 | Stop reason: HOSPADM

## 2025-01-01 RX ORDER — ONDANSETRON 4 MG/1
4 TABLET, ORALLY DISINTEGRATING ORAL EVERY 8 HOURS PRN
Status: DISCONTINUED | OUTPATIENT
Start: 2025-01-01 | End: 2025-01-01 | Stop reason: HOSPADM

## 2025-01-01 RX ORDER — PANTOPRAZOLE SODIUM 40 MG/1
40 TABLET, DELAYED RELEASE ORAL
Status: DISCONTINUED | OUTPATIENT
Start: 2025-01-19 | End: 2025-01-01 | Stop reason: HOSPADM

## 2025-01-01 RX ORDER — METOPROLOL TARTRATE 1 MG/ML
5 INJECTION, SOLUTION INTRAVENOUS EVERY 6 HOURS PRN
Status: DISCONTINUED | OUTPATIENT
Start: 2025-01-01 | End: 2025-01-01 | Stop reason: HOSPADM

## 2025-01-01 RX ADMIN — SODIUM CHLORIDE 500 ML: 900 INJECTION, SOLUTION INTRAVENOUS at 10:32

## 2025-01-01 RX ADMIN — IOHEXOL 75 ML: 350 INJECTION, SOLUTION INTRAVENOUS at 11:11

## 2025-01-01 RX ADMIN — DEXTROSE MONOHYDRATE 490.2 MG: 50 INJECTION, SOLUTION INTRAVENOUS at 13:37

## 2025-01-01 RX ADMIN — HEPARIN SODIUM 5000 UNITS: 5000 INJECTION, SOLUTION INTRAVENOUS; SUBCUTANEOUS at 13:43

## 2025-01-01 RX ADMIN — CALCIUM GLUCONATE 2 G: 20 INJECTION, SOLUTION INTRAVENOUS at 12:20

## 2025-01-01 RX ADMIN — ONDANSETRON 4 MG: 2 INJECTION INTRAMUSCULAR; INTRAVENOUS at 14:08

## 2025-01-01 RX ADMIN — CEFTRIAXONE SODIUM 2 G: 2 INJECTION, SOLUTION INTRAVENOUS at 13:37

## 2025-01-01 RX ADMIN — DILTIAZEM HYDROCHLORIDE 20.5 MG: 5 INJECTION, SOLUTION INTRAVENOUS at 12:43

## 2025-01-01 SDOH — ECONOMIC STABILITY: TRANSPORTATION INSECURITY: IN THE PAST 12 MONTHS, HAS LACK OF TRANSPORTATION KEPT YOU FROM MEDICAL APPOINTMENTS OR FROM GETTING MEDICATIONS?: NO

## 2025-01-01 SDOH — HEALTH STABILITY: PHYSICAL HEALTH
HOW OFTEN DO YOU NEED TO HAVE SOMEONE HELP YOU WHEN YOU READ INSTRUCTIONS, PAMPHLETS, OR OTHER WRITTEN MATERIAL FROM YOUR DOCTOR OR PHARMACY?: NEVER

## 2025-01-01 SDOH — HEALTH STABILITY: MENTAL HEALTH: HOW OFTEN DO YOU HAVE SIX OR MORE DRINKS ON ONE OCCASION?: NEVER

## 2025-01-01 SDOH — SOCIAL STABILITY: SOCIAL NETWORK: HOW OFTEN DO YOU GET TOGETHER WITH FRIENDS OR RELATIVES?: THREE TIMES A WEEK

## 2025-01-01 SDOH — ECONOMIC STABILITY: HOUSING INSECURITY: AT ANY TIME IN THE PAST 12 MONTHS, WERE YOU HOMELESS OR LIVING IN A SHELTER (INCLUDING NOW)?: NO

## 2025-01-01 SDOH — HEALTH STABILITY: PHYSICAL HEALTH: ON AVERAGE, HOW MANY MINUTES DO YOU ENGAGE IN EXERCISE AT THIS LEVEL?: 30 MIN

## 2025-01-01 SDOH — ECONOMIC STABILITY: FOOD INSECURITY: WITHIN THE PAST 12 MONTHS, THE FOOD YOU BOUGHT JUST DIDN'T LAST AND YOU DIDN'T HAVE MONEY TO GET MORE.: NEVER TRUE

## 2025-01-01 SDOH — SOCIAL STABILITY: SOCIAL INSECURITY: WITHIN THE LAST YEAR, HAVE YOU BEEN HUMILIATED OR EMOTIONALLY ABUSED IN OTHER WAYS BY YOUR PARTNER OR EX-PARTNER?: NO

## 2025-01-01 SDOH — HEALTH STABILITY: MENTAL HEALTH
DO YOU FEEL STRESS - TENSE, RESTLESS, NERVOUS, OR ANXIOUS, OR UNABLE TO SLEEP AT NIGHT BECAUSE YOUR MIND IS TROUBLED ALL THE TIME - THESE DAYS?: ONLY A LITTLE

## 2025-01-01 SDOH — ECONOMIC STABILITY: INCOME INSECURITY: IN THE PAST 12 MONTHS HAS THE ELECTRIC, GAS, OIL, OR WATER COMPANY THREATENED TO SHUT OFF SERVICES IN YOUR HOME?: NO

## 2025-01-01 SDOH — ECONOMIC STABILITY: HOUSING INSECURITY: IN THE PAST 12 MONTHS, HOW MANY TIMES HAVE YOU MOVED WHERE YOU WERE LIVING?: 0

## 2025-01-01 SDOH — HEALTH STABILITY: PHYSICAL HEALTH: ON AVERAGE, HOW MANY DAYS PER WEEK DO YOU ENGAGE IN MODERATE TO STRENUOUS EXERCISE (LIKE A BRISK WALK)?: 3 DAYS

## 2025-01-01 SDOH — HEALTH STABILITY: MENTAL HEALTH: HOW MANY DRINKS CONTAINING ALCOHOL DO YOU HAVE ON A TYPICAL DAY WHEN YOU ARE DRINKING?: 1 OR 2

## 2025-01-01 SDOH — SOCIAL STABILITY: SOCIAL INSECURITY: ARE YOU MARRIED, WIDOWED, DIVORCED, SEPARATED, NEVER MARRIED, OR LIVING WITH A PARTNER?: WIDOWED

## 2025-01-01 SDOH — ECONOMIC STABILITY: HOUSING INSECURITY: IN THE LAST 12 MONTHS, WAS THERE A TIME WHEN YOU WERE NOT ABLE TO PAY THE MORTGAGE OR RENT ON TIME?: NO

## 2025-01-01 SDOH — HEALTH STABILITY: MENTAL HEALTH: HOW OFTEN DO YOU HAVE A DRINK CONTAINING ALCOHOL?: 2-3 TIMES A WEEK

## 2025-01-01 SDOH — SOCIAL STABILITY: SOCIAL NETWORK
DO YOU BELONG TO ANY CLUBS OR ORGANIZATIONS SUCH AS CHURCH GROUPS, UNIONS, FRATERNAL OR ATHLETIC GROUPS, OR SCHOOL GROUPS?: NO

## 2025-01-01 SDOH — SOCIAL STABILITY: SOCIAL INSECURITY: WITHIN THE LAST YEAR, HAVE YOU BEEN AFRAID OF YOUR PARTNER OR EX-PARTNER?: NO

## 2025-01-01 SDOH — ECONOMIC STABILITY: FOOD INSECURITY: WITHIN THE PAST 12 MONTHS, YOU WORRIED THAT YOUR FOOD WOULD RUN OUT BEFORE YOU GOT THE MONEY TO BUY MORE.: NEVER TRUE

## 2025-01-01 SDOH — SOCIAL STABILITY: SOCIAL INSECURITY
WITHIN THE LAST YEAR, HAVE YOU BEEN RAPED OR FORCED TO HAVE ANY KIND OF SEXUAL ACTIVITY BY YOUR PARTNER OR EX-PARTNER?: NO

## 2025-01-01 SDOH — SOCIAL STABILITY: SOCIAL INSECURITY
WITHIN THE LAST YEAR, HAVE YOU BEEN KICKED, HIT, SLAPPED, OR OTHERWISE PHYSICALLY HURT BY YOUR PARTNER OR EX-PARTNER?: NO

## 2025-01-01 SDOH — ECONOMIC STABILITY: FOOD INSECURITY: HOW HARD IS IT FOR YOU TO PAY FOR THE VERY BASICS LIKE FOOD, HOUSING, MEDICAL CARE, AND HEATING?: NOT HARD AT ALL

## 2025-01-01 SDOH — SOCIAL STABILITY: SOCIAL NETWORK: HOW OFTEN DO YOU ATTEND MEETINGS OF THE CLUBS OR ORGANIZATIONS YOU BELONG TO?: NEVER

## 2025-01-01 SDOH — SOCIAL STABILITY: SOCIAL NETWORK: HOW OFTEN DO YOU ATTEND CHURCH OR RELIGIOUS SERVICES?: NEVER

## 2025-01-01 SDOH — SOCIAL STABILITY: SOCIAL NETWORK
IN A TYPICAL WEEK, HOW MANY TIMES DO YOU TALK ON THE PHONE WITH FAMILY, FRIENDS, OR NEIGHBORS?: MORE THAN THREE TIMES A WEEK

## 2025-01-01 ASSESSMENT — LIFESTYLE VARIABLES
HAVE PEOPLE ANNOYED YOU BY CRITICIZING YOUR DRINKING: NO
HAVE YOU EVER FELT YOU SHOULD CUT DOWN ON YOUR DRINKING: NO
AUDIT-C TOTAL SCORE: 3
EVER FELT BAD OR GUILTY ABOUT YOUR DRINKING: NO
EVER HAD A DRINK FIRST THING IN THE MORNING TO STEADY YOUR NERVES TO GET RID OF A HANGOVER: NO
TOTAL SCORE: 0
SKIP TO QUESTIONS 9-10: 1

## 2025-01-01 ASSESSMENT — ENCOUNTER SYMPTOMS
ACTIVITY CHANGE: 1
NAUSEA: 0
DIARRHEA: 0
WEAKNESS: 1
CONSTIPATION: 0
DYSURIA: 0
DIFFICULTY URINATING: 1
VOMITING: 0
DECREASED APPETITE: 1
TROUBLE SWALLOWING: 0
COUGH: 0
FATIGUE: 1
APPETITE CHANGE: 1
SHORTNESS OF BREATH: 1
ABDOMINAL PAIN: 0
CONFUSION: 0

## 2025-01-01 ASSESSMENT — COLUMBIA-SUICIDE SEVERITY RATING SCALE - C-SSRS
2. HAVE YOU ACTUALLY HAD ANY THOUGHTS OF KILLING YOURSELF?: NO
1. IN THE PAST MONTH, HAVE YOU WISHED YOU WERE DEAD OR WISHED YOU COULD GO TO SLEEP AND NOT WAKE UP?: NO
6. HAVE YOU EVER DONE ANYTHING, STARTED TO DO ANYTHING, OR PREPARED TO DO ANYTHING TO END YOUR LIFE?: NO

## 2025-01-01 ASSESSMENT — PAIN DESCRIPTION - LOCATION: LOCATION: LEG

## 2025-01-01 ASSESSMENT — CHA2DS2 SCORE
PRIOR STROKE OR TIA OR THROMBOEMBOLISM: NO
AGE IN YEARS: 75+
DIABETES: NO
CHA2D2S VASC SCORE: 3
VASCULAR DISEASE: NO
HYPERTENSION: NO
SEX: FEMALE
CHF OR LEFT VENTRICULAR DYSFUNCTION: NO

## 2025-01-01 ASSESSMENT — ACTIVITIES OF DAILY LIVING (ADL): LACK_OF_TRANSPORTATION: NO

## 2025-01-01 ASSESSMENT — PAIN SCALES - GENERAL
PAINLEVEL_OUTOF10: 0 - NO PAIN
PAINLEVEL_OUTOF10: 10 - WORST POSSIBLE PAIN

## 2025-01-01 ASSESSMENT — PAIN - FUNCTIONAL ASSESSMENT: PAIN_FUNCTIONAL_ASSESSMENT: 0-10

## 2025-01-18 PROBLEM — N17.9 ACUTE RENAL FAILURE (ARF) (CMS-HCC): Status: ACTIVE | Noted: 2025-01-01

## 2025-01-18 PROBLEM — R79.89 ELEVATED TROPONIN: Status: ACTIVE | Noted: 2025-01-01

## 2025-01-18 PROBLEM — J90 BILATERAL PLEURAL EFFUSION: Status: ACTIVE | Noted: 2025-01-01

## 2025-01-18 PROBLEM — E87.1 HYPONATREMIA: Status: ACTIVE | Noted: 2025-01-01

## 2025-01-18 PROBLEM — J18.9 BILATERAL PNEUMONIA: Status: ACTIVE | Noted: 2025-01-01

## 2025-01-18 PROBLEM — R74.8 ELEVATED LIVER ENZYMES: Status: ACTIVE | Noted: 2025-01-01

## 2025-01-18 PROBLEM — R60.0 EDEMA OF RIGHT LOWER EXTREMITY: Status: ACTIVE | Noted: 2025-01-01

## 2025-01-18 PROBLEM — I48.91 ATRIAL FIBRILLATION WITH RVR (MULTI): Status: ACTIVE | Noted: 2025-01-01

## 2025-01-18 PROBLEM — E87.5 HYPERKALEMIA: Status: ACTIVE | Noted: 2025-01-01

## 2025-01-18 NOTE — ASSESSMENT & PLAN NOTE
Suspect elevation due to acute renal failure and demand from Afib with RVR  Low suspicion for ACS  Management per cardiology

## 2025-01-18 NOTE — PROGRESS NOTES
Tiffanie Perez is a 79 y.o. female on day 0 of admission presenting with Atrial fibrillation with RVR (Multi).       01/18/25 1442   Physical Activity   On average, how many days per week do you engage in moderate to strenuous exercise (like a brisk walk)? 3 days   On average, how many minutes do you engage in exercise at this level? 30 min   Financial Resource Strain   How hard is it for you to pay for the very basics like food, housing, medical care, and heating? Not hard   Housing Stability   In the last 12 months, was there a time when you were not able to pay the mortgage or rent on time? N   In the past 12 months, how many times have you moved where you were living? 0   At any time in the past 12 months, were you homeless or living in a shelter (including now)? N   Transportation Needs   In the past 12 months, has lack of transportation kept you from medical appointments or from getting medications? no   In the past 12 months, has lack of transportation kept you from meetings, work, or from getting things needed for daily living? No   Food Insecurity   Within the past 12 months, you worried that your food would run out before you got the money to buy more. Never true   Within the past 12 months, the food you bought just didn't last and you didn't have money to get more. Never true   Stress   Do you feel stress - tense, restless, nervous, or anxious, or unable to sleep at night because your mind is troubled all the time - these days? Only a littl   Social Connections   In a typical week, how many times do you talk on the phone with family, friends, or neighbors? More than 3   How often do you get together with friends or relatives? Three times   How often do you attend Orthodox or Church services? Never   Do you belong to any clubs or organizations such as Orthodox groups, unions, fraternal or athletic groups, or school groups? No   How often do you attend meetings of the clubs or organizations you belong  to? Never   Are you , , , , never , or living with a partner?    Intimate Partner Violence   Within the last year, have you been afraid of your partner or ex-partner? No   Within the last year, have you been humiliated or emotionally abused in other ways by your partner or ex-partner? No   Within the last year, have you been kicked, hit, slapped, or otherwise physically hurt by your partner or ex-partner? No   Within the last year, have you been raped or forced to have any kind of sexual activity by your partner or ex-partner? No   Alcohol Use   Q1: How often do you have a drink containing alcohol? 2-3 per wk   Q2: How many drinks containing alcohol do you have on a typical day when you are drinking? 1 or 2   Q3: How often do you have six or more drinks on one occasion? Never   Utilities   In the past 12 months has the electric, gas, oil, or water company threatened to shut off services in your home? No   Health Literacy   How often do you need to have someone help you when you read instructions, pamphlets, or other written material from your doctor or pharmacy? Never         Halie Colorado RN

## 2025-01-18 NOTE — ASSESSMENT & PLAN NOTE
With decreased urine output -> ?obstruction  Likely worsened by volume depletion from decreased PO intake  Received IV contrast in the ED  CK wnl   Consult nephrology   Check CT A/P  Check urine studies   Place du, monitor I&Os

## 2025-01-18 NOTE — ASSESSMENT & PLAN NOTE
Unclear etiology  US negative for DVT  Concern for possible ?intra-abdominal/pelvic obstruction -> follow up CT A/P

## 2025-01-18 NOTE — CONSULTS
Inpatient consult to Cardiology  Consult performed by: BALAJI Lamas-CNP  Consult ordered by: Yael Gonzalez MD  Reason for consult: Atrial fibrillation with Rapid Ventricular Rate        History Of Present Illness:    Tiffanie Perez is a 79 y.o. female presenting with fatigue and shortness of breath. Past medical history includes hyperlipidemia. The patient reports that symptoms started approximately one week ago. She began having difficulty urinating as well as shortness of breath. Her right leg also became quite swollen. She also reports significant fatigue as well as loss of appetite. Denies chest pain.  Patient does report a subjective fever a few days ago.  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  Patient presented to [facility] emergency department which time lab works completed revealing a sodium of 124, potassium 5.8, creatinine 1.20 and hemoglobin of 15.0.  White blood cell count elevated at 14.3.  ALT and AST elevated at 119 and 113 respectively.  High-sensitivity troponin levels elevated but in a flat pattern at 681 and 678.  The patient was negative for COVID-19 and influenza.  CT angio of the chest negative for pulmonary embolism but noted near complete collapse/consolidation of the right middle lobe and partial collapse/consolidation of lower lobes, bilateral pleural effusions, larger on the right, patchy nonspecific groundglass opacities possibly related to infectious or inflammatory process.  Chest x-ray with mild congestion and edema, bilateral pleural effusions and infiltrates in the lung bases worse on the right.  EKG in the emergency department revealed atrial fibrillation with rapid ventricular rate of 130 bpm.  The patient was given a 500 cc fluid bolus, IV diltiazem, IV Rocephin and calcium gluconate in the emergency department and was admitted to the hospital on telemetry for further  assessment and management.    Review of Systems   Constitutional: Positive for decreased appetite and malaise/fatigue.   Cardiovascular:  Negative for chest pain.   Respiratory:  Positive for shortness of breath.    Genitourinary:         Difficulty urinating   All other systems reviewed and are negative.          Last Recorded Vitals:  Vitals:    01/18/25 1145 01/18/25 1211 01/18/25 1243 01/18/25 1253   BP:  (!) 123/98 (!) 125/100 100/82   BP Location:    Left arm   Patient Position:    Sitting   Pulse: (!) 140  (!) 132 80   Resp: 20   16   Temp:       TempSrc:       SpO2: 94%      Weight:       Height:           Last Labs:  CBC - 1/18/2025:  9:58 AM  14.3 15.0 231    43.8      CMP - 1/18/2025:  1:08 PM  10.0 7.0 113 --- 1.7   3.9 3.8 119 173      PTT - No results in last year.  _   _ _     Troponin I, High Sensitivity   Date/Time Value Ref Range Status   01/18/2025 10:51  (HH) 0 - 13 ng/L Final     Comment:     Previous result verified on 1/18/2025 1058 on specimen/case [id number] called with component [facility] for procedure Troponin I, High Sensitivity, Initial with value 681 ng/L.   01/18/2025 09:58  (HH) 0 - 13 ng/L Final     Hemoglobin A1C   Date/Time Value Ref Range Status   03/04/2022 09:08 AM 5.7 4.0 - 6.0 % Final     Comment:     Hemoglobin A1C levels are related to mean blood glucose during the   preceding 2-3 months. The relationship table below may be used as a   general guide. Each 1% increase in HGB A1C is a reflection of an   increase in mean glucose of approximately 30 mg/dl.   Reference: Diabetes [employer], volume 29, supplement 1 Jan. 2006                        HGB A1C ................. Approx. Mean Glucose   _______________________________________________   6%   ...............................  120 mg/dl   7%   ...............................  150 mg/dl   8%   ...............................  180 mg/dl   9%   ...............................  210 mg/dl   10%   "...............................  240 mg/dl  Performed at [address] [address]     01/07/2021 08:43 AM 5.5 4.0 - 6.0 % Final     Comment:     Hemoglobin A1C levels are related to mean blood glucose during the   preceding 2-3 months. The relationship table below may be used as a   general guide. Each 1% increase in HGB A1C is a reflection of an   increase in mean glucose of approximately 30 mg/dl.   Reference: Diabetes Care, volume 29, supplement 1 Jan. 2006                        HGB A1C ................. Approx. Mean Glucose   _______________________________________________   6%   ...............................  120 mg/dl   7%   ...............................  150 mg/dl   8%   ...............................  180 mg/dl   9%   ...............................  210 mg/dl   10%  ...............................  240 mg/dl  Performed at [address] [address]       LDL Calculated   Date/Time Value Ref Range Status   03/14/2024 08:08  65 - 130 mg/dL Final   02/24/2023 08:00  65 - 130 MG/DL Final   03/04/2022 09:08  (H) 65 - 130 MG/DL Final   01/07/2021 08:43  65 - 130 MG/DL Final      Last I/O:  No intake/output data recorded.    Past Cardiology Tests (Last 3 Years):  EKG:  No results found for this or any previous visit from the past 1095 days.    Echo:  No results found for this or any previous visit from the past 1095 days.    Ejection Fractions:  No results found for: \"EF\"  Cath:  No results found for this or any previous visit from the past 1095 days.    Stress Test:  No results found for this or any previous visit from the past 1095 days.    Cardiac Imaging:  No results found for this or any previous visit from the past 1095 days.      Past Medical History:  She has no past medical history on file.    Past Surgical History:  She has no past surgical history on file.      Social History:  She reports that she has never smoked. She has never " been exposed to tobacco smoke. She has never used smokeless tobacco. She reports current alcohol use. She reports that she does not use drugs.    Family History:  No family history on file.     Allergies:  Adhesive tape-silicones and Other    Inpatient Medications:  Scheduled medications   Medication Dose Route Frequency    azithromycin  500 mg intravenous Once    [START ON 1/19/2025] azithromycin  250 mg oral Daily    [START ON 1/19/2025] cefTRIAXone  1 g intravenous q24h    heparin (porcine)  5,000 Units subcutaneous q8h KIRILL    metoprolol tartrate  25 mg oral BID    [START ON 1/19/2025] pantoprazole  40 mg oral Daily before breakfast    Or    [START ON 1/19/2025] pantoprazole  40 mg intravenous Daily before breakfast    polyethylene glycol  17 g oral Daily     PRN medications   Medication    metoprolol    ondansetron ODT    Or    ondansetron     Continuous Medications   Medication Dose Last Rate     Outpatient Medications:  Current Outpatient Medications   Medication Instructions    ascorbic acid (VITAMIN C) 1,000 mg, oral, Daily    atorvastatin (LIPITOR) 10 mg, oral, Daily    cholecalciferol (VITAMIN D-3) 800 Units, oral, Daily    clobetasol (Temovate) 0.05 % cream 1 Application, Topical, 2 times daily, to affected area    docosahexaenoic acid/epa (FISH OIL ORAL) 1 capsule, oral, Daily    multivit-min/ferrous fumarate (MULTI VITAMIN ORAL) 1 tablet, oral, Daily    mv-min/FA/vit K/lycop/lut/zeax (OCUVITE EYE PLUS MULTI ORAL) oral, 2 times daily    nystatin (Mycostatin) cream 1 Application, Topical, 2 times daily       Physical Exam  Vitals reviewed.   Constitutional:       Appearance: She is ill-appearing.   Cardiovascular:      Rate and Rhythm: Normal rate. Rhythm irregular.      Heart sounds: No murmur heard.     No friction rub. No gallop.   Pulmonary:      Effort: Tachypnea present.      Comments: Breath sounds diminished in bilateral lung bases  Abdominal:      General: Bowel sounds are normal.       Palpations: Abdomen is soft.   Musculoskeletal:      Right lower leg: Edema present.      Left lower leg: No edema.   Skin:     Coloration: Skin is pale.   Neurological:      Mental Status: She is alert and oriented to person, place, and time.            Assessment/Plan   New Onset Atrial Fibrillation with Rapid Ventricular Response  Acute Renal Failure  Bilateral Pneumonia  Hyperkalemia  Hyponatremia  Elevated Liver Enzymes  Bilateral Pleural Effusion  Hyperlipidemia    Impression and Plan:    1/18: Described above.  Patient with symptoms for approximately 1 week including decreased urine output, shortness of breath, fatigue and loss of appetite.  She was also complaining of right lower extremity swelling.  Ultrasound the right lower extremity negative for DVT.  CT of the chest negative for pulmonary embolism.  She was found to be in rapid atrial fibrillation on arrival to the hospital.  Rates did improve with a one-time dose of IV diltiazem.  On my exam the patient is resting in bed. She is ill-appearing and rather tachypneic, but does not appear rather fluid volume overloaded. She is not currently on telemetry which I will address with the bedside RN. She is saturating well on room air. Kidney function did improve with an IV fluid bolus and agree with gentle IV hydration per admitting team.  Regarding her atrial fibrillation which is new onset agree with simply starting metoprolol to tartrate 25 mg twice daily for rate control at the moment.  High-sensitivity troponins elevated but in a flat pattern not consistent with acute coronary syndrome.  Elevation likely reflective of a type II injury due to acute kidney injury as well as acute pneumonia.  Of note, the patient's [id number] score is 3 given her age and female gender.  Would recommend anticoagulation, but I am going to hold off on starting this at this time and would continue subcutaneous heparin in case thoracentesis may be needed for her pleural  effusions. Nephrology, GI and pulmonology services have been consulted. Echocardiogram has been ordered to be completed, and will review when available. Patient is at high risk for further decompensation at this time. Will continue to follow with you.     Peripheral IV 01/18/25 20 G Left Antecubital (Active)   Site Assessment Clean;Dry;Intact 01/18/25 1253   Line Status Saline locked 01/18/25 1253   Dressing Status Clean;Dry 01/18/25 1253   Number of days: 0       Code Status:  DNR and No Intubation            Cris Gan, APRN-CNP

## 2025-01-18 NOTE — ED PROVIDER NOTES
"HPI   Chief Complaint   Patient presents with    Shortness of Breath     Pt states that she has been SOB since Tuesday, threw up 2x last wed and that she has not been urinating as much. Right leg is also swollen and painful and having trouble walking on it and states there was no injury       Patient is a 79-year-old female presenting with concerns for weakness, shortness of breath, leg swelling.  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  Denies history of PEs or DVTs.  Is not on blood thinners at this time.  Patient denies fevers, chills, cough, sore throat, runny nose, chest pain, shortness of breath, abdominal pain, nausea, vomiting, diarrhea.  Patient feels like she is retaining urine at this time.  States that she is increasing her oral hydration but only having \"trickles of urine\".              Patient History   No past medical history on file.  No past surgical history on file.  No family history on file.  Social History     Tobacco Use    Smoking status: Never     Passive exposure: Never    Smokeless tobacco: Never   Substance Use Topics    Alcohol use: Yes    Drug use: Never       Physical Exam   ED Triage Vitals [01/18/25 0923]   Temperature Heart Rate Respirations BP   36.5 °C (97.7 °F) 83 18 (!) 121/108      Pulse Ox Temp Source Heart Rate Source Patient Position   100 % Temporal -- --      BP Location FiO2 (%)     -- --       Physical Exam  Vitals and nursing note reviewed.   Constitutional:       Appearance: She is well-developed.      Comments: Awake, sitting in wheelchair   HENT:      Head: Normocephalic and atraumatic.      Nose: Nose normal.      Mouth/Throat:      Mouth: Mucous membranes are moist.      Pharynx: Oropharynx is clear.   Eyes:      Extraocular Movements: Extraocular movements intact.      Conjunctiva/sclera: Conjunctivae normal.      Pupils: Pupils are equal, round, and reactive to light. "   Cardiovascular:      Rate and Rhythm: Tachycardia present. Rhythm irregular.      Pulses: Normal pulses.      Heart sounds: Normal heart sounds. No murmur heard.  Pulmonary:      Effort: Pulmonary effort is normal. No respiratory distress.      Breath sounds: Normal breath sounds.   Abdominal:      General: Abdomen is flat.      Palpations: Abdomen is soft.      Tenderness: There is no abdominal tenderness.   Musculoskeletal:         General: No swelling. Normal range of motion.      Cervical back: Normal range of motion and neck supple.      Comments: There is swelling appreciated in the right ankle   Skin:     General: Skin is warm and dry.      Capillary Refill: Capillary refill takes less than 2 seconds.   Neurological:      General: No focal deficit present.      Mental Status: She is alert and oriented to person, place, and time.   Psychiatric:         Mood and Affect: Mood normal.         Behavior: Behavior normal.           ED Course & MDM   ED Course as of 01/18/25 1447   Sat Jan 18, 2025   1030 ECG 12 lead  Performed at  1029, HR of 130, irregularly irregular, NAD, QTc 438, no sign of STEMI, no Q wave or T wave abnormality noted.    Reviewed and interpreted by me at time performed   [JM]   1211 Sepsis suspected at this documented time.  Sepsis bundle initiated at this time given concerns for pneumonia on CT imaging.  Patient unable to tolerate 30 cc/kg bolus due to significant hyponatremia which is new for her. [JM]   1258 I performed a sepsis reperfusion exam on Tiffanie Perez on 1/18/2025 12:58 PM    Delmer Juarez PA-C   [AR]   125 I spoke with admitting provider, Dr. Gonzalez. After discussion, patient will be admitted to stepdown for new onset A-fib with RVR, sepsis likely related to pneumonia causing renal failure, electrolyte abnormalities. [AR]      ED Course User Index  [AR] Delmer Juarez PA-C  [JM] Zaida Avina MD         Diagnoses as of 01/18/25 1447   Atrial  fibrillation with RVR (Multi)   Hyponatremia   Hyperkalemia   Sepsis with acute renal failure without septic shock, due to unspecified organism, unspecified acute renal failure type (Multi)   Pneumonia of both lower lobes due to infectious organism   Acute kidney injury (CMS-HCC)   Elevated troponin                 [id number] Score: 3     Dre Coma Scale Score: 15 (01/18/25 1053 : Tanja Hair RN)                           Medical Decision Making  Patient is a 79-year-old female presenting with weakness, shortness of breath, leg swelling.  Lab work, viral swabs, urine, imaging ordered.  Conditions considered include but are not limited to: UTI, anemia, pneumonia, viral illness.    I saw this patient in conjunction with Dr. Tyrell Avina.  CBC does show leukocytosis with WBCs of 14.3 but without signs of anemia.  Patient's EKG does show atrial fibrillation with RVR.  New onset.  Initial 500 cc bolus ordered.  D-dimer of 13.77.  [id number] of 3.  CMP does show hyponatremia at 124 and hyperkalemia with potassium of 5.8.  EKG without Q wave or T wave abnormality.  Calcium ordered.  Ultrasound without acute findings.  Troponins of 681 and 678.  Due to cardiology being occupied by a new patient with STEMI, cardiology was not consulted.  No chest pain at this time.  Chest x-ray shows mild congestion with bilateral pleural effusions larger on the right.  Concern for pneumonia in the bilateral lung fields.  CT PE without acute findings for PE but does show near complete collapse and consolidation to the right middle lobe and partial collapse of both lower lobes.  Patient started on antibiotics.  IV fluids and that initially helped with patient's A-fib RVR so Cardizem bolus ordered.    I spoke with admitting provider, Dr. Gonzalez.  After discussion, patient will be admitted for further management of electrolyte abnormalities, elevated troponin, pneumonia and new onset of atrial fibrillation with rapid ventricular  rate.  As I deemed necessary from the patient's history, physical, laboratory and imaging findings as well as ED course, I considered the above listed diagnoses.    Portions of this note made with Dragon software, please be mindful of potential grammatical errors.      Medications   heparin (porcine) injection 5,000 Units (5,000 Units subcutaneous Given 1/18/25 1343)   pantoprazole (ProtoNix) EC tablet 40 mg (has no administration in time range)     Or   pantoprazole (ProtoNix) injection 40 mg (has no administration in time range)   ondansetron ODT (Zofran-ODT) disintegrating tablet 4 mg ( oral See Alternative 1/18/25 1408)     Or   ondansetron (Zofran) injection 4 mg (4 mg intravenous Given 1/18/25 1408)   polyethylene glycol (Glycolax, Miralax) packet 17 g (17 g oral Not Given 1/18/25 1342)   azithromycin (Zithromax) tablet 250 mg (has no administration in time range)   cefTRIAXone (Rocephin) 1 g in dextrose (iso) IV 50 mL (has no administration in time range)   metoprolol tartrate (Lopressor) injection 5 mg (has no administration in time range)   metoprolol tartrate (Lopressor) tablet 25 mg (has no administration in time range)   sodium chloride 0.9% infusion (has no administration in time range)   sodium chloride 0.9 % bolus 500 mL (0 mL intravenous Stopped 1/18/25 1222)   iohexol (OMNIPaque) 350 mg iodine/mL solution 75 mL (75 mL intravenous Given 1/18/25 1111)   calcium gluconate 2 g in sodium chloride (iso)  mL (0 g intravenous Stopped 1/18/25 1338)   cefTRIAXone (Rocephin) 2 g in dextrose (iso) IV 50 mL (0 g intravenous Stopped 1/18/25 1406)   azithromycin (Zithromax) 500 mg in dextrose 5% 250 mL IV (490.1961 mg intravenous New Bag 1/18/25 1337)   dilTIAZem (Cardizem) injection 20.5 mg (20.5 mg intravenous Given 1/18/25 1243)     Labs Reviewed   CBC WITH AUTO DIFFERENTIAL - Abnormal       Result Value    WBC 14.3 (*)     nRBC 0.1 (*)     RBC 4.72      Hemoglobin 15.0      Hematocrit 43.8      MCV 93       MCH 31.8      MCHC 34.2      RDW 15.1 (*)     Platelets 231      Neutrophils % 85.4      Immature Granulocytes %, Automated 0.7      Lymphocytes % 7.2      Monocytes % 6.4      Eosinophils % 0.1      Basophils % 0.2      Neutrophils Absolute 12.20 (*)     Immature Granulocytes Absolute, Automated 0.10      Lymphocytes Absolute 1.03      Monocytes Absolute 0.92 (*)     Eosinophils Absolute 0.01      Basophils Absolute 0.03     COMPREHENSIVE METABOLIC PANEL - Abnormal    Glucose 158 (*)     Sodium 124 (*)     Potassium 5.8 (*)     Chloride 92 (*)     Bicarbonate 21      Anion Gap 17      Urea Nitrogen 38 (*)     Creatinine 1.20 (*)     eGFR 46 (*)     Calcium 9.4      Albumin 3.8      Alkaline Phosphatase 173 (*)     Total Protein 7.0       (*)     Bilirubin, Total 1.7 (*)      (*)    D-DIMER, NON VTE - Abnormal    D-Dimer Non VTE, Quant (mg/L FEU) 13.77 (*)     Narrative:     THROMBOEMBOLIC EVENTS CANNOT BE EXCLUDED SOLELY ON THE BASIS OF THE D-DIMER LEVEL BEING WITHIN THE NORMAL REFERENCE RANGE. D-DIMER LEVELS LESS THAN 0.5 MG/L FEU IN CONJUNCTION WITH A LOW CLINICAL PROBABILITY HAVE AN EXCELLENT NEGATIVE PREDICTIVE VALUE IN EXCLUDING A DIAGNOSIS OF PULMONARY EMBOLUS (PE) OR DEEP VEIN THROMBOSIS (DVT). ELEVATED D-DIMER LEVELS ARE NOT SPECIFIC TO PE OR DVT, AND MAY BE SEEN IN PATIENTS WITH DIC, ADVANCED AGE, PREGNANCY, MALIGNANCY, LIVER DISEASE, INFECTION, AND INFLAMMATORY CONDITIONS AMONG OTHERS. D-DIMER LEVELS MAY BE DECREASED IN PATIENTS RECEIVING ANTI-COAGULATION THERAPY.   SERIAL TROPONIN-INITIAL - Abnormal    Troponin I, High Sensitivity 681 (*)     Narrative:     Less than 99th percentile of normal range cutoff-  Female and children under 18 years old <14 ng/L; Male <21 ng/L: Negative  Repeat testing should be performed if clinically indicated.     Female and children under 18 years old 14-50 ng/L; Male 21-50 ng/L:  Consistent with possible cardiac damage and possible increased clinical    risk. Serial measurements may help to assess extent of myocardial damage.     >50 ng/L: Consistent with cardiac damage, increased clinical risk and  myocardial infarction. Serial measurements may help assess extent of   myocardial damage.      NOTE: Children less than 1 year old may have higher baseline troponin   levels and results should be interpreted in conjunction with the overall   clinical context.     NOTE: Troponin I testing is performed using a different   testing methodology at [facility] than at other   [facility]. Direct result comparisons should only   be made within the same method.   URINALYSIS WITH REFLEX CULTURE AND MICROSCOPIC - Abnormal    Color, Urine Yellow      Appearance, Urine Clear      Specific Gravity, Urine >1.050 (*)     pH, Urine 5.5      Protein, Urine 50 (1+) (*)     Glucose, Urine Normal      Blood, Urine NEGATIVE      Ketones, Urine NEGATIVE      Bilirubin, Urine NEGATIVE      Urobilinogen, Urine 2 (1+) (*)     Nitrite, Urine NEGATIVE      Leukocyte Esterase, Urine NEGATIVE     SERIAL TROPONIN, 1 HOUR - Abnormal    Troponin I, High Sensitivity 678 (*)     Narrative:     Less than 99th percentile of normal range cutoff-  Female and children under 18 years old <14 ng/L; Male <21 ng/L: Negative  Repeat testing should be performed if clinically indicated.     Female and children under 18 years old 14-50 ng/L; Male 21-50 ng/L:  Consistent with possible cardiac damage and possible increased clinical   risk. Serial measurements may help to assess extent of myocardial damage.     >50 ng/L: Consistent with cardiac damage, increased clinical risk and  myocardial infarction. Serial measurements may help assess extent of   myocardial damage.      NOTE: Children less than 1 year old may have higher baseline troponin   levels and results should be interpreted in conjunction with the overall   clinical context.     NOTE: Troponin I testing is performed using a different    testing methodology at [facility] than at other   [facility]. Direct result comparisons should only   be made within the same method.   BASIC METABOLIC PANEL - Abnormal    Glucose 128 (*)     Sodium 124 (*)     Potassium 5.5 (*)     Chloride 93 (*)     Bicarbonate 21      Anion Gap 16      Urea Nitrogen 38 (*)     Creatinine 1.07 (*)     eGFR 53 (*)     Calcium 10.0     B-TYPE NATRIURETIC PEPTIDE - Abnormal    BNP 2,073 (*)     Narrative:        <100 pg/mL - Heart failure unlikely  100-299 pg/mL - Intermediate probability of acute heart                  failure exacerbation. Correlate with clinical                  context and patient history.    >=300 pg/mL - Heart Failure likely. Correlate with clinical                  context and patient history.    BNP testing is performed using different testing methodology at [facility] than at other [facility]. Direct result comparisons should only be made within the same method.      URINALYSIS MICROSCOPIC WITH REFLEX CULTURE - Abnormal    WBC, Urine 1-5      RBC, Urine 3-5      Mucus, Urine FEW      Hyaline Casts, Urine 1+ (*)    SARS-COV-2 AND INFLUENZA A/B PCR - Normal    Flu A Result Not Detected      Flu B Result Not Detected      Coronavirus 2019, PCR Not Detected      Narrative:     This assay has received FDA Emergency Use Authorization (EUA) and  is only authorized for the duration of time that circumstances exist to justify the authorization of the emergency use of in vitro diagnostic tests for the detection of SARS-CoV-2 virus and/or diagnosis of COVID-19 infection under section 564(b)(1) of the Act, 21 U.S.C. 360bbb-3(b)(1). Testing for SARS-CoV-2 is only recommended for patients who meet current clinical and/or epidemiological criteria as defined by federal, state, or local public health directives. This assay is an in vitro diagnostic nucleic acid amplification test for the qualitative detection of SARS-CoV-2,  Influenza A, and Influenza B from nasopharyngeal specimens and has been validated for use at [facility]. Negative results do not preclude COVID-19 infections or Influenza A/B infections, and should not be used as the sole basis for diagnosis, treatment, or other management decisions. If Influenza A/B and RSV PCR results are negative, testing for Parainfluenza virus, Adenovirus and Metapneumovirus is routinely performed for [facility] pediatric oncology and intensive care inpatients, and is available on other patients by placing an add-on request.    LACTATE - Normal    Lactate 2.0      Narrative:     Venipuncture immediately after or during the administration of Metamizole may lead to falsely low results. Testing should be performed immediately prior to Metamizole dosing.   TSH WITH REFLEX TO FREE T4 IF ABNORMAL - Normal    Thyroid Stimulating Hormone 3.56      Narrative:     TSH testing is performed using different testing methodology at [facility] than at [facility]. Direct result comparisons should only be made within the same method.     CREATINE KINASE - Normal    Creatine Kinase 76     MAGNESIUM - Normal    Magnesium 2.26     PHOSPHORUS - Normal    Phosphorus 3.9     ACETAMINOPHEN - Normal    Acetaminophen <10.0     BLOOD CULTURE   BLOOD CULTURE   LEGIONELLA ANTIGEN, URINE   STREPTOCOCCUS PNEUMONIAE ANTIGEN, URINE   RESPIRATORY CULTURE/SMEAR   STAPHYLOCOCCUS AUREUS/MRSA COLONIZATION, CULTURE   TROPONIN SERIES- (INITIAL, 1 HR)    Narrative:     The following orders were created for panel order Troponin I Series, High Sensitivity (0, 1 HR).  Procedure                               Abnormality         Status                     ---------                               -----------         ------                     Troponin I, High Sensiti...[[id number]]  Abnormal            Final result               Troponin, High Sensitivi...[[id number]]  Abnormal            Final result                  Please view results for these tests on the individual orders.   SODIUM, URINE RANDOM    Sodium, Urine Random <10      Creatinine, Urine Random 163.6      Sodium/Creatinine Ratio       URINALYSIS WITH REFLEX CULTURE AND MICROSCOPIC    Narrative:     The following orders were created for panel order Urinalysis with Reflex Culture and Microscopic.  Procedure                               Abnormality         Status                     ---------                               -----------         ------                     Urinalysis with Reflex C...[[id number]]  Abnormal            Final result               Extra Urine Gray Tube[[id number]]                            In process                   Please view results for these tests on the individual orders.   EXTRA URINE GRAY TUBE   MYCOPLASMA PNEUMONIAE ANTIBODY, IGM   HEMOGLOBIN A1C   OSMOLALITY   OSMOLALITY, URINE   BLOOD GAS ARTERIAL FULL PANEL       CT abdomen pelvis wo IV contrast   Final Result   1. Gallbladder wall thickening with cholelithiasis. This could   represent acute cholecystitis but could also represent wall   thickening related to viral hepatitis or hypoproteinemia.   2. Reticulation of the subcutaneous fat which could represent   anasarca/hypoproteinemia. Presacral edema.   3. Small amount of non loculated ascites in the pelvis.   4. Constipation. Colonic diverticula with no diverticulitis.   5. Moderate-sized bilateral pleural effusions with right middle lobe   pneumonia.        MACRO:   None        Signed by: Zara Fung 1/18/2025 2:15 PM   Dictation workstation:   [id number]      Lower extremity venous duplex right   Final Result   No deep venous thrombosis right lower extremity   Edema in the subcutaneous fat of the right calf.        MACRO:   None        Signed by: Zara Fung 1/18/2025 11:47 AM   Dictation workstation:   [id number]      XR chest 1 view   Final Result   Mild congestion and edema. Bilateral pleural effusions,  larger on the   right. Infiltrates in the lung bases, worse on the right.        MACRO:   None.        Signed by: Kameron Aviles 1/18/2025 11:33 AM   Dictation workstation:   [id number]      CT angio chest for pulmonary embolism   Final Result   No evidence of a pulmonary embolism. Please note that some of the   segmental and subsegmental pulmonary arterial branches could not be   adequately evaluated secondary to the limitations described above.   Near complete collapse/consolidation of the right middle lobe and   partial collapse/consolidation of the lower lobes. Bilateral pleural   effusions, larger on the right. Patchy nonspecific ground-glass   opacities, some of which demonstrate a nodular appearance, possibly   related to an infectious or inflammatory process but follow-up is   recommended.        MACRO:   None.        Signed by: Kameron Aviles 1/18/2025 11:32 AM   Dictation workstation:   [id number]      Transthoracic Echo (TTE) Complete    (Results Pending)         Procedure  Critical Care    Performed by: Delmer Juarez PA-C  Authorized by: Zaida Avina MD    Critical care provider statement:     Critical care time (minutes):  34    Critical care time was exclusive of:  Separately billable procedures and treating other patients and teaching time    Critical care was necessary to treat or prevent imminent or life-threatening deterioration of the following conditions:  Renal failure and sepsis (And atrial fibrillation with rapid ventricular rate)    Critical care was time spent personally by me on the following activities:  Blood draw for specimens, development of treatment plan with patient or surrogate, evaluation of patient's response to treatment, examination of patient, interpretation of cardiac output measurements, obtaining history from patient or surrogate, ordering and performing treatments and interventions, ordering and review of laboratory studies, ordering and review of radiographic  studies, pulse oximetry, re-evaluation of patient's condition and review of old charts       Delmer Juarez PA-C  01/18/25 8067

## 2025-01-18 NOTE — ED TRIAGE NOTES
Pt states that she has been SOB since Tuesday, threw up 2x last wed and that she has not been urinating as much. Right leg is also swollen and painful and having trouble walking on it and states there was no injury

## 2025-01-18 NOTE — ED NOTES
At 1433  I walked in to give the patient a menu, she was unresponsive rapid response was called. Patient was a DNR DNI. Dr cooper was at bedside when the patient lost a pulse. No measures were to be taken per Dr. Cooper TOCELSO 1432     Juliana Ansari, [place]  01/18/25 0302

## 2025-01-18 NOTE — H&P
History Of Present Illness  Tiffanie Perez is a 79 y.o. female hx of HLD presenting with fatigue and shortness of breath. Patient states that she was in normal health until about 1 week ago. Noticed that she started having difficulty urinating and her right leg was getting very swollen to the point where she could no longer ambulate. She also endorses loss of appetite. Reports subjective fever a few days ago but denies fever recently. Denies new medications and recent illness. 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 States the only medication she takes daily is low dose atorvastatin and some vitamins. Denies hx of atrial fibrillation in the past. Denies fever, chills, cough, chest pain, nausea, vomiting, abdominal pain, dysuria, hematuria, constipation, and diarrhea. Denies new rashes or wounds.     In the ED, found to be in new onset Afib with RVR. HR in the 130s-140s. BP stable. SpO2 94% on room air. Afebrile. Labs showing , Na 125, K 5.8, Cl 92, Cr 1.20, alk phos 173, , , bilirubin 1.7, WBC 14.3. D-dimer 13.77. Troponin 681 -> 678. Lactic acid 2.0. COVID/influenza negative. RLE US negative for DVT.     CTA chest:  No evidence of a pulmonary embolism. Please note that some of the  segmental and subsegmental pulmonary arterial branches could not be  adequately evaluated secondary to the limitations described above.  Near complete collapse/consolidation of the right middle lobe and  partial collapse/consolidation of the lower lobes. Bilateral pleural  effusions, larger on the right. Patchy nonspecific ground-glass  opacities, some of which demonstrate a nodular appearance, possibly  related to an infectious or inflammatory process but follow-up is  recommended.    Given IV calcium gluconate 2g x1, cardizem 20.5mg IV x1, rocephin x1, azithro x1, and 500cc NS bolus x1.     Patient's  sister, Nolvia, present at bedside.     Code status discussed with patient - DNRCCA, DNI. Ok for ICU transfer.      Past Medical History  She has no past medical history on file.    Surgical History  She has no past surgical history on file.     Social History  She reports that she has never smoked. She has never been exposed to tobacco smoke. She has never used smokeless tobacco. She reports current alcohol use. She reports that she does not use drugs.    Family History  No family history on file.     Allergies  Adhesive tape-silicones and Other    Review of Systems   Constitutional:  Positive for activity change, appetite change and fatigue.   HENT:  Negative for congestion and trouble swallowing.    Respiratory:  Positive for shortness of breath. Negative for cough.    Cardiovascular:  Positive for leg swelling.   Gastrointestinal:  Negative for abdominal pain, constipation, diarrhea, nausea and vomiting.   Genitourinary:  Positive for difficulty urinating. Negative for dysuria.   Musculoskeletal:  Positive for gait problem.   Neurological:  Positive for weakness.   Psychiatric/Behavioral:  Negative for confusion.         Physical Exam  Constitutional:       General: She is not in acute distress.     Appearance: She is ill-appearing. She is not toxic-appearing.   HENT:      Head: Normocephalic.      Mouth/Throat:      Mouth: Mucous membranes are dry.      Pharynx: Oropharynx is clear.   Eyes:      General: No scleral icterus.  Cardiovascular:      Rate and Rhythm: Normal rate. Rhythm irregular.   Pulmonary:      Effort: No respiratory distress.      Breath sounds: No wheezing.      Comments: Diminished at B/L bases. Tachypneic. Conversationally dyspneic. On room air  Abdominal:      General: There is no distension.      Palpations: Abdomen is soft.      Tenderness: There is no abdominal tenderness.   Musculoskeletal:      Right lower leg: Edema (trace at the ankle) present.      Left lower leg: No edema.   Skin:      Comments: Postoperative changes of the left ankle. No obvious wounds or sores present. Slight duskiness at the plantar surface of the foot. Left foot is cold to touch but has palpable pulses   Neurological:      Mental Status: She is alert and oriented to person, place, and time.   Psychiatric:         Behavior: Behavior normal.          Last Recorded Vitals  /82 (BP Location: Left arm, Patient Position: Sitting)   Pulse 80 Comment: after cardizem  Temp 36.5 °C (97.7 °F) (Temporal)   Resp 16   Wt 81.6 kg (180 lb)   SpO2 94%     Relevant Results           Assessment/Plan   Assessment & Plan  Atrial fibrillation with RVR (Multi)  New onset  Consult cardiology  TSH wnl  Echocardiogram ordered   Start low dose metoprolol with hold parameters  Lopressor 5mg IV q6hrs for HR >120  CHADSVASC 3 -> defer anticoagulation to cardiology   Elevated troponin  Suspect elevation due to acute renal failure and demand from Afib with RVR  Low suspicion for ACS  Management per cardiology   Acute renal failure (ARF) (CMS-HCC)  With decreased urine output -> ?obstruction  Likely worsened by volume depletion from decreased PO intake  Received IV contrast in the ED  CK wnl   Consult nephrology   Check CT A/P  Check urine studies   Place [place], monitor I&Os  Hyperkalemia  Due to acute renal failure  Given IV calcium gluconate 2g x1 in the ED and IVF  Repeat BMP pending   Hyponatremia  Management per nephrology   Bilateral pleural effusion  Right > left  Unclear etiology  Patient denies cardiac hx but if she has been in Afib with RVR for a week, concern for possible ?tachy-mediated cardiomyopathy  Consult pulmonology   Check echo  Hold diuresis for now given JOSÉ LUIS  May need thoracentesis if not improving, will defer to pulmonology   Bilateral pneumonia  COVID/influenza negative   Consult pulmonology  Sputum culture if able  Check strep pneumo, legionella, mycoplasma, and MRSA PCR  Continue rocephin and azithromycin for now  Edema of  right lower extremity  Unclear etiology  US negative for DVT  Concern for possible ?intra-abdominal/pelvic obstruction -> follow up CT A/P  Elevated liver enzymes  Unclear etiology  Check acetaminophen level   Hold home statin   Follow up CT A/P  Consult GI      Plan:  Admit to step down  Consult cardiology, GI, and nephrology   Follow up CT A/P  Follow up infectious workup  Rocephin/azithro for CAP coverage  Echo ordered  Check labs in the AM  DNRCCA, DNI    Patient's sister, Nolvia, [phone]           Yael Gonzalez MD

## 2025-01-18 NOTE — DISCHARGE SUMMARY
Discharge Diagnosis  Atrial fibrillation with RVR (Multi)    Issues Requiring Follow-Up  N/a    Discharge Meds     Medication List      ASK your doctor about these medications     atorvastatin 10 mg tablet; Commonly known as: Lipitor; Take 1 tablet (10   mg) by mouth once daily.   cholecalciferol 10 MCG (400 UNIT) tablet; Commonly known as: Vitamin D-3   clobetasol 0.05 % cream; Commonly known as: Temovate; Apply 1   Application topically 2 times a day. to affected area   FISH OIL ORAL   MULTI VITAMIN ORAL   nystatin cream; Commonly known as: Mycostatin   OCUVITE EYE PLUS MULTI ORAL   Vitamin C 1,000 mg tablet; Generic drug: ascorbic acid       Test Results Pending At Discharge  Pending Labs       Order Current Status    Blood Culture In process    Blood Culture In process    Extra Tubes In process    Extra Urine Gray Tube In process    Hemoglobin A1c In process    Lavender Top In process    Legionella Antigen, Urine In process    Mycoplasma pneumoniae antibody, IgM In process    Osmolality In process    Osmolality, urine In process    Streptococcus pneumoniae Antigen, Urine In process    Urinalysis with Reflex Culture and Microscopic In process            Hospital Course   Presented shortness of breath and fatigue. With Afib with RVR, B/L pleural effusions, B/L pneumonia, acute renal failure. Unfortunately patient  while in the emergency department. DNRCCA, DNI. Time of death 1437. Notified patient's sister Nolvia. Nolvia states that patient wanted to donate her body to science through the [employer] [phone]. RN notified to arrange accordingly.     Pertinent Physical Exam At Time of Discharge  Physical Exam    Death Pronouncement Note     There were no heart tones present.   There were no breath sounds or spontaneous respirations.   There was no palpable carotid pulse.   The pupils were fixed and dilated.  There was no response to noxious stimuli.   Next of kin was notified.      Tiffanie  Chris was pronounced dead on 1/18/25 at 1437     Yael Gonzalez MD     Outpatient Follow-Up  Future Appointments   Date Time Provider Department Center   1/28/2025 11:30 AM Elizabeth Hou, APRN-CNP [id number]     Time spent on discharge: 35 minutes    Yael Gonzalez MD

## 2025-01-18 NOTE — PROGRESS NOTES
Tiffanie Perez is a 79 y.o. female on day 0 of admission presenting with Atrial fibrillation with RVR (Multi).       01/18/25 2288   ACS Disability Status   Are you deaf or do you have serious difficulty hearing? N   Are you blind or do you have serious difficulty seeing, even when wearing glasses? N   Because of a physical, mental, or emotional condition, do you have serious difficulty concentrating, remembering, or making decisions? (5 years old or older) N   Do you have serious difficulty walking or climbing stairs? N   Do you have serious difficulty dressing or bathing? N   Because of a physical, mental, or emotional condition, do you have serious difficulty doing errands alone such as visiting the doctor? N       Halie Colorado RN

## 2025-01-18 NOTE — ED NOTES
"IV to right anticube red and infiltrated, patient crying that IV site \"hurts\".  Site pink and warm.  Removed IV from right ac, started #20 left forearm and resumed IV Potassium drip.  Elevated right5 arm on 2 pillows, applied warm compresses with barrier between skin and warm compresses.     Steffi Villanueva RN  01/18/25 0953       Steffi Villanueva RN  01/18/25 0953    "

## 2025-01-18 NOTE — PROGRESS NOTES
Tiffanie Perez is a 79 y.o. female on day 0 of admission presenting with Atrial fibrillation with RVR (Multi).    Patient lives with her sister in a single level house. She uses a cane as needed. She is independent with ADLs, daily tasks, and drives. 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 PCP is Berta Hou CNP.  ADOD 01/21/2025  Cardiology, nephrology, and GI consults ordered.  No skilled needs are anticipated for discharge.  Plan is to discharge home with no needs, her sister will transport.     Halie Colorado RN

## 2025-01-18 NOTE — PROGRESS NOTES
Tiffanie Perez is a 79 y.o. female on day 0 of admission presenting with Atrial fibrillation with RVR (Multi).       01/18/25 1444   Discharge Planning   Living Arrangements Family members   Support Systems Family members   Assistance Needed none   Type of Residence Private residence   Number of Stairs to Enter Residence 1   Number of Stairs Within Residence 0   Do you have animals or pets at home? No   Who is requesting discharge planning? Provider   Home or Post Acute Services None   Expected Discharge Disposition Home   Does the patient need discharge transport arranged? No   Patient Choice   Provider Choice list and CMS website (https://medicare.gov/care-compare#search) for post-acute Quality and Resource Measure Data were provided and reviewed with: Other (Comment)  (no skilled needs are anticipated)   Patient / Family choosing to utilize agency / facility established prior to hospitalization No   Stroke Family Assessment   Stroke Family Assessment Needed No         Halie Colorado RN

## 2025-01-18 NOTE — ASSESSMENT & PLAN NOTE
Due to acute renal failure  Given IV calcium gluconate 2g x1 in the ED and IVF  Repeat BMP pending

## 2025-01-18 NOTE — ASSESSMENT & PLAN NOTE
New onset  Consult cardiology  TSH wnl  Echocardiogram ordered   Start low dose metoprolol with hold parameters  Lopressor 5mg IV q6hrs for HR >120  CHADSVASC 3 -> defer anticoagulation to cardiology

## 2025-01-18 NOTE — ASSESSMENT & PLAN NOTE
COVID/influenza negative   Consult pulmonology  Sputum culture if able  Check strep pneumo, legionella, mycoplasma, and MRSA PCR  Continue rocephin and azithromycin for now

## 2025-01-18 NOTE — ASSESSMENT & PLAN NOTE
Right > left  Unclear etiology  Patient denies cardiac hx but if she has been in Afib with RVR for a week, concern for possible ?tachy-mediated cardiomyopathy  Consult pulmonology   Check echo  Hold diuresis for now given JOSÉ LUIS  May need thoracentesis if not improving, will defer to pulmonology

## 2025-01-18 NOTE — H&P
History Of Present Illness  Tiffanie Perez is a 79 y.o. female hx of HLD presenting with fatigue and shortness of breath. Patient states that she was in normal health until about 1 week ago. Noticed that she started having difficulty urinating and her right leg was getting very swollen to the point where she could no longer ambulate. She also endorses loss of appetite. Reports subjective fever a few days ago but denies fever recently. Denies new medications and recent illness. 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 States the only medication she takes daily is low dose atorvastatin and some vitamins. Denies hx of atrial fibrillation in the past. Denies fever, chills, cough, chest pain, nausea, vomiting, abdominal pain, dysuria, hematuria, constipation, and diarrhea. Denies new rashes or wounds.     In the ED, found to be in new onset Afib with RVR. HR in the 130s-140s. BP stable. SpO2 94% on room air. Afebrile. Labs showing , Na 125, K 5.8, Cl 92, Cr 1.20, alk phos 173, , , bilirubin 1.7, WBC 14.3. D-dimer 13.77. Troponin 681 -> 678. Lactic acid 2.0. COVID/influenza negative. RLE US negative for DVT.     CTA chest:  No evidence of a pulmonary embolism. Please note that some of the  segmental and subsegmental pulmonary arterial branches could not be  adequately evaluated secondary to the limitations described above.  Near complete collapse/consolidation of the right middle lobe and  partial collapse/consolidation of the lower lobes. Bilateral pleural  effusions, larger on the right. Patchy nonspecific ground-glass  opacities, some of which demonstrate a nodular appearance, possibly  related to an infectious or inflammatory process but follow-up is  recommended.    Given IV calcium gluconate 2g x1, cardizem 20.5mg IV x1, rocephin x1, azithro x1, and 500cc NS bolus x1.     Patient's  sister, Nolvia, present at bedside.     Code status discussed with patient - DNRCCA, DNI. Ok for ICU transfer.      Past Medical History  She has no past medical history on file.    Surgical History  She has no past surgical history on file.     Social History  She reports that she has never smoked. She has never been exposed to tobacco smoke. She has never used smokeless tobacco. She reports current alcohol use. She reports that she does not use drugs.    Family History  No family history on file.     Allergies  Adhesive tape-silicones and Other    Review of Systems   Constitutional:  Positive for activity change, appetite change and fatigue.   HENT:  Negative for congestion and trouble swallowing.    Respiratory:  Positive for shortness of breath. Negative for cough.    Cardiovascular:  Positive for leg swelling.   Gastrointestinal:  Negative for abdominal pain, constipation, diarrhea, nausea and vomiting.   Genitourinary:  Positive for difficulty urinating. Negative for dysuria.   Musculoskeletal:  Positive for gait problem.   Neurological:  Positive for weakness.   Psychiatric/Behavioral:  Negative for confusion.         Physical Exam  Constitutional:       General: She is not in acute distress.     Appearance: She is ill-appearing. She is not toxic-appearing.   HENT:      Head: Normocephalic.      Mouth/Throat:      Mouth: Mucous membranes are dry.      Pharynx: Oropharynx is clear.   Eyes:      General: No scleral icterus.  Cardiovascular:      Rate and Rhythm: Normal rate. Rhythm irregular.   Pulmonary:      Effort: No respiratory distress.      Breath sounds: No wheezing.      Comments: Diminished at B/L bases. Tachypneic. Conversationally dyspneic. On room air  Abdominal:      General: There is no distension.      Palpations: Abdomen is soft.      Tenderness: There is no abdominal tenderness.   Musculoskeletal:      Right lower leg: Edema (trace at the ankle) present.      Left lower leg: No edema.   Skin:      Comments: Postoperative changes of the left ankle. No obvious wounds or sores present. Slight duskiness at the plantar surface of the foot. Left foot is cold to touch but has palpable pulses   Neurological:      Mental Status: She is alert and oriented to person, place, and time.   Psychiatric:         Behavior: Behavior normal.        Last Recorded Vitals  /82 (BP Location: Left arm, Patient Position: Sitting)   Pulse 80 Comment: after cardizem  Temp 36.5 °C (97.7 °F) (Temporal)   Resp 16   Wt 81.6 kg (180 lb)   SpO2 94%     Relevant Results           Assessment/Plan   Assessment & Plan  Atrial fibrillation with RVR (Multi)  New onset  Consult cardiology  TSH wnl  Echocardiogram ordered   Start low dose metoprolol with hold parameters  Lopressor 5mg IV q6hrs for HR >120  CHADSVASC 3 -> defer anticoagulation to cardiology   Elevated troponin  Suspect elevation due to acute renal failure and demand from Afib with RVR  Low suspicion for ACS  Management per cardiology   Acute renal failure (ARF) (CMS-HCC)  With decreased urine output -> ?obstruction  Likely worsened by volume depletion from decreased PO intake  Received IV contrast in the ED  CK wnl   Consult nephrology   Check CT A/P  Check urine studies   Place [place], monitor I&Os  Hyperkalemia  Due to acute renal failure  Given IV calcium gluconate 2g x1 in the ED and IVF  Repeat BMP pending   Hyponatremia  Management per nephrology   Bilateral pleural effusion  Right > left  Unclear etiology  Patient denies cardiac hx but if she has been in Afib with RVR for a week, concern for possible ?tachy-mediated cardiomyopathy  Consult pulmonology   Check echo  Hold diuresis for now given JOSÉ LUIS  May need thoracentesis if not improving, will defer to pulmonology   Bilateral pneumonia  COVID/influenza negative   Consult pulmonology  Sputum culture if able  Check strep pneumo, legionella, mycoplasma, and MRSA PCR  Continue rocephin and azithromycin for now  Edema of  right lower extremity  Unclear etiology  US negative for DVT  Concern for possible ?intra-abdominal/pelvic obstruction -> follow up CT A/P  Elevated liver enzymes  Unclear etiology  Check acetaminophen level   Hold home statin   Follow up CT A/P  Consult GI      Plan:  Admit to step down  Consult cardiology, GI, and nephrology   Follow up CT A/P  Follow up infectious workup  Rocephin/azithro for CAP coverage  Echo ordered  Check labs in the AM  DNRCCA, DNI    Patient's sister, Nolvia, [phone]      Addendum 14:30 - repeat BMP showing improvement in renal function and hyperkalemia after IVF bolus. Suspect pre-renal JOSÉ LUIS due to volume depletion. Will start gentle IVF. CT A/P showing gallbladder wall thickening with cholelithiasis         Yael Gonzalez MD

## 2025-01-19 LAB
BACTERIA BLD CULT: ABNORMAL
BACTERIA BLD CULT: NORMAL
EST. AVERAGE GLUCOSE BLD GHB EST-MCNC: 114 MG/DL
GRAM STN SPEC: ABNORMAL
HBA1C MFR BLD: 5.6 %
HOLD SPECIMEN: NORMAL
LEGIONELLA AG UR QL: NEGATIVE
OSMOLALITY SERPL: 286 MOSM/KG (ref 280–300)
S PNEUM AG UR QL: NEGATIVE

## 2025-01-19 NOTE — ED PROCEDURE NOTE
Procedure  Critical Care    Performed by: Zaida Avina MD  Authorized by: Zaida Avina MD    Critical care provider statement:     Critical care time (minutes):  40    Critical care time was exclusive of:  Separately billable procedures and treating other patients and teaching time    Critical care was necessary to treat or prevent imminent or life-threatening deterioration of the following conditions:  Sepsis, metabolic crisis and cardiac failure    Critical care was time spent personally by me on the following activities:  Development of treatment plan with patient or surrogate, discussions with primary provider, evaluation of patient's response to treatment, examination of patient, obtaining history from patient or surrogate, ordering and performing treatments and interventions, ordering and review of laboratory studies, ordering and review of radiographic studies, re-evaluation of patient's condition, pulse oximetry and review of old charts    Care discussed with: admitting provider    Comments:      Pneumonia, A-fib RVR, electrolyte abnormalities               Zaida Avina MD  01/19/25 1896

## 2025-01-21 LAB — M PNEUMO IGM SER IA-ACNC: 0.07 U/L

## 2025-01-22 LAB — BACTERIA BLD CULT: NORMAL

## 2025-01-23 LAB
BACTERIA BLD AEROBE CULT: ABNORMAL
BACTERIA BLD CULT: ABNORMAL
GRAM STN SPEC: ABNORMAL

## 2025-01-28 ENCOUNTER — APPOINTMENT (OUTPATIENT)
Dept: PRIMARY CARE | Facility: CLINIC | Age: 80
End: 2025-01-28
Payer: MEDICARE

## 2025-01-29 NOTE — DOCUMENTATION CLARIFICATION NOTE
"    PATIENT:               LIYAH ALLEN  ACCT #:                  [id number]  MRN:                       [id number]  :                       1945  ADMIT DATE:       2025 9:31 AM  DISCH DATE:        2025 4:36 PM  RESPONDING PROVIDER #:        [id number]          PROVIDER RESPONSE TEXT:    Acute congestive heart failure    CDI QUERY TEXT:    Clarification        Instruction:    Based on your assessment of the patient and the clinical information, please provide the requested documentation by clicking on the appropriate radio button and enter any additional information if prompted.    Question: Please further clarify the etiology of pulmonary edema    When answering this query, please exercise your independent professional judgment. The fact that a question is being asked, does not imply that any particular answer is desired or expected.    The patient's clinical indicators include:  Clinical Information: 79 y./o. female presenting with fatigue and shortness of breath.    Clinical Indicators:    H&P: \"Respiratory:  Positive for shortness of breath. Negative for cough.  Cardiovascular:  Positive for leg swelling. \"  \" Comments: Diminished at B/L bases. Tachypneic. Conversationally dyspneic. On room air\"  \"Patient denies cardiac hx \"    Cardiac Consult: \" Chest x-ray with mild congestion and edema\"    Treatment: Hold diuresis for now given JOSÉ LUIS, CXR, Cardiac consult, Chest CT    Risk Factors: age, HTN, a-fib, pleural effusions  Options provided:  -- Non Cardiogenic acute pulmonary edema  -- Chronic pulmonary edema  -- Other - I will add my own diagnosis  -- Refer to Clinical Documentation Reviewer    Query created by: Tosha Hensley on 2025 2:29 PM      Electronically signed by:  DI JIANG 2025 12:41 PM          "

## 2025-01-30 NOTE — DOCUMENTATION CLARIFICATION NOTE
PATIENT:               [facility] ALLEN  [id number]  MRN:                       [id number]  :                       1945  ADMIT DATE:       2025 9:31 AM  DISCH DATE:        2025 4:36 PM  RESPONDING PROVIDER #:        [id number]          PROVIDER RESPONSE TEXT:    Transaminitis of unclear etiology    CDI QUERY TEXT:    Clarification        Instruction:    Based on your assessment of the patient and the clinical information, please provide the requested documentation by clicking on the appropriate radio button and enter any additional information if prompted.    Question: Please further clarify patient hepatic status as    When answering this query, please exercise your independent professional judgment. The fact that a question is being asked, does not imply that any particular answer is desired or expected.    The patient's clinical indicators include:  Clinical Information: 79 y.o. female with Sepsis and Pneumonia and JOSÉ LUIS. With increased AST,ALT and Bilirubin.    Clinical Indicators:   Electrolytes: glucose 158, Sodium 124, Potassium 5.8, Chloride 92, Creatine 1.20         Bilirubin 1.7    Treatment: labs, IV Zithromax 500mg , IV Rocephen 2g , IV fluids     Risk Factors: Sepsis, age, pneumonia  Options provided:  -- Acute liver failure  -- Subacute liver failure  -- Other - I will add my own diagnosis  -- Refer to Clinical Documentation Reviewer    Query created by: Tosha Hensley on 2025 9:38 AM      Electronically signed by:  DIANE ARIAS MD 2025 9:54 AM